# Patient Record
Sex: FEMALE | Race: WHITE | Employment: OTHER | URBAN - METROPOLITAN AREA
[De-identification: names, ages, dates, MRNs, and addresses within clinical notes are randomized per-mention and may not be internally consistent; named-entity substitution may affect disease eponyms.]

---

## 2020-02-20 ENCOUNTER — TELEPHONE (OUTPATIENT)
Dept: INTERNAL MEDICINE CLINIC | Facility: CLINIC | Age: 70
End: 2020-02-20

## 2020-02-20 ENCOUNTER — OFFICE VISIT (OUTPATIENT)
Dept: INTERNAL MEDICINE CLINIC | Facility: CLINIC | Age: 70
End: 2020-02-20
Payer: MEDICARE

## 2020-02-20 VITALS
BODY MASS INDEX: 29.26 KG/M2 | HEART RATE: 76 BPM | RESPIRATION RATE: 16 BRPM | HEIGHT: 63.5 IN | SYSTOLIC BLOOD PRESSURE: 132 MMHG | OXYGEN SATURATION: 98 % | DIASTOLIC BLOOD PRESSURE: 74 MMHG | WEIGHT: 167.19 LBS

## 2020-02-20 DIAGNOSIS — E03.8 OTHER SPECIFIED HYPOTHYROIDISM: ICD-10-CM

## 2020-02-20 DIAGNOSIS — E78.9 BORDERLINE HIGH CHOLESTEROL: ICD-10-CM

## 2020-02-20 DIAGNOSIS — Z85.828 HISTORY OF BASAL CELL CARCINOMA OF SKIN: ICD-10-CM

## 2020-02-20 DIAGNOSIS — E89.0 S/P TOTAL THYROIDECTOMY: ICD-10-CM

## 2020-02-20 DIAGNOSIS — Z12.11 SCREEN FOR COLON CANCER: ICD-10-CM

## 2020-02-20 DIAGNOSIS — E03.8 OTHER SPECIFIED HYPOTHYROIDISM: Primary | ICD-10-CM

## 2020-02-20 DIAGNOSIS — E78.9 BORDERLINE HIGH CHOLESTEROL: Primary | ICD-10-CM

## 2020-02-20 PROBLEM — Z98.890 S/P TOTAL THYROIDECTOMY: Status: ACTIVE | Noted: 2020-02-20

## 2020-02-20 PROBLEM — Z90.89 S/P TOTAL THYROIDECTOMY: Status: ACTIVE | Noted: 2020-02-20

## 2020-02-20 PROCEDURE — 99203 OFFICE O/P NEW LOW 30 MIN: CPT | Performed by: INTERNAL MEDICINE

## 2020-02-20 RX ORDER — LEVOTHYROXINE SODIUM 0.1 MG/1
100 TABLET ORAL
COMMUNITY
Start: 2020-01-23 | End: 2020-08-28

## 2020-02-20 NOTE — PROGRESS NOTES
He Mccoy is a 78year old female.   Patient presents with:  New Patient: cn room 4: new patient here to establish care , no issues       HPI:     Patient here for establishing care-  hypothroidism - takes name brand synthroid and feels better when ts supple,no adenopathy, s/p thyroidectomy  LUNGS: normal rate without respiratory distress, lungs clear to auscultation  CARDIO: RRR nl S1 S2   NEURO: Alert and oriented    ASSESSMENT AND PLAN:   Other specified hypothyroidism  - clinically stable, rpt labs

## 2020-02-20 NOTE — TELEPHONE ENCOUNTER
Wellness   Future Appointments   Date Time Provider Gregg Wilson   8/10/2020  9:00 AM Jessica Khan MD [facility]     Orders to Vibha Platts  aware must fast no call back required

## 2020-07-27 ENCOUNTER — LAB ENCOUNTER (OUTPATIENT)
Dept: LAB | Age: 70
End: 2020-07-27
Attending: INTERNAL MEDICINE
Payer: MEDICARE

## 2020-07-27 DIAGNOSIS — Z85.828 HISTORY OF BASAL CELL CARCINOMA OF SKIN: ICD-10-CM

## 2020-07-27 DIAGNOSIS — E78.9 BORDERLINE HIGH CHOLESTEROL: ICD-10-CM

## 2020-07-27 DIAGNOSIS — E03.8 OTHER SPECIFIED HYPOTHYROIDISM: ICD-10-CM

## 2020-07-27 LAB
ALBUMIN SERPL-MCNC: 3.5 G/DL (ref 3.4–5)
ALBUMIN/GLOB SERPL: 1.2 {RATIO} (ref 1–2)
ALP LIVER SERPL-CCNC: 56 U/L (ref 55–142)
ALT SERPL-CCNC: 19 U/L (ref 13–56)
ANION GAP SERPL CALC-SCNC: 1 MMOL/L (ref 0–18)
AST SERPL-CCNC: 13 U/L (ref 15–37)
BASOPHILS # BLD AUTO: 0.03 X10(3) UL (ref 0–0.2)
BASOPHILS NFR BLD AUTO: 0.6 %
BILIRUB SERPL-MCNC: 0.6 MG/DL (ref 0.1–2)
BUN BLD-MCNC: 15 MG/DL (ref 7–18)
BUN/CREAT SERPL: 15.2 (ref 10–20)
CALCIUM BLD-MCNC: 8.5 MG/DL (ref 8.5–10.1)
CHLORIDE SERPL-SCNC: 109 MMOL/L (ref 98–112)
CHOLEST SMN-MCNC: 185 MG/DL (ref ?–200)
CO2 SERPL-SCNC: 31 MMOL/L (ref 21–32)
CREAT BLD-MCNC: 0.99 MG/DL (ref 0.55–1.02)
DEPRECATED RDW RBC AUTO: 42.3 FL (ref 35.1–46.3)
EOSINOPHIL # BLD AUTO: 0.14 X10(3) UL (ref 0–0.7)
EOSINOPHIL NFR BLD AUTO: 2.9 %
ERYTHROCYTE [DISTWIDTH] IN BLOOD BY AUTOMATED COUNT: 12.2 % (ref 11–15)
GLOBULIN PLAS-MCNC: 3 G/DL (ref 2.8–4.4)
GLUCOSE BLD-MCNC: 78 MG/DL (ref 70–99)
HCT VFR BLD AUTO: 44.7 % (ref 35–48)
HDLC SERPL-MCNC: 58 MG/DL (ref 40–59)
HGB BLD-MCNC: 14.3 G/DL (ref 12–16)
IMM GRANULOCYTES # BLD AUTO: 0.01 X10(3) UL (ref 0–1)
IMM GRANULOCYTES NFR BLD: 0.2 %
LDLC SERPL CALC-MCNC: 109 MG/DL (ref ?–100)
LYMPHOCYTES # BLD AUTO: 0.93 X10(3) UL (ref 1–4)
LYMPHOCYTES NFR BLD AUTO: 19.6 %
M PROTEIN MFR SERPL ELPH: 6.5 G/DL (ref 6.4–8.2)
MCH RBC QN AUTO: 30.5 PG (ref 26–34)
MCHC RBC AUTO-ENTMCNC: 32 G/DL (ref 31–37)
MCV RBC AUTO: 95.3 FL (ref 80–100)
MONOCYTES # BLD AUTO: 0.46 X10(3) UL (ref 0.1–1)
MONOCYTES NFR BLD AUTO: 9.7 %
NEUTROPHILS # BLD AUTO: 3.18 X10 (3) UL (ref 1.5–7.7)
NEUTROPHILS # BLD AUTO: 3.18 X10(3) UL (ref 1.5–7.7)
NEUTROPHILS NFR BLD AUTO: 67 %
NONHDLC SERPL-MCNC: 127 MG/DL (ref ?–130)
OSMOLALITY SERPL CALC.SUM OF ELEC: 292 MOSM/KG (ref 275–295)
PATIENT FASTING Y/N/NP: YES
PATIENT FASTING Y/N/NP: YES
PLATELET # BLD AUTO: 166 10(3)UL (ref 150–450)
POTASSIUM SERPL-SCNC: 4 MMOL/L (ref 3.5–5.1)
RBC # BLD AUTO: 4.69 X10(6)UL (ref 3.8–5.3)
SODIUM SERPL-SCNC: 141 MMOL/L (ref 136–145)
T4 FREE SERPL-MCNC: 1.2 NG/DL (ref 0.8–1.7)
TRIGL SERPL-MCNC: 91 MG/DL (ref 30–149)
TSI SER-ACNC: 1.14 MIU/ML (ref 0.36–3.74)
VLDLC SERPL CALC-MCNC: 18 MG/DL (ref 0–30)
WBC # BLD AUTO: 4.8 X10(3) UL (ref 4–11)

## 2020-07-27 PROCEDURE — 84443 ASSAY THYROID STIM HORMONE: CPT

## 2020-07-27 PROCEDURE — 85025 COMPLETE CBC W/AUTO DIFF WBC: CPT

## 2020-07-27 PROCEDURE — 84439 ASSAY OF FREE THYROXINE: CPT

## 2020-07-27 PROCEDURE — 80061 LIPID PANEL: CPT

## 2020-07-27 PROCEDURE — 80053 COMPREHEN METABOLIC PANEL: CPT

## 2020-08-10 ENCOUNTER — OFFICE VISIT (OUTPATIENT)
Dept: INTERNAL MEDICINE CLINIC | Facility: CLINIC | Age: 70
End: 2020-08-10
Payer: MEDICARE

## 2020-08-10 VITALS
HEIGHT: 63.5 IN | BODY MASS INDEX: 29.22 KG/M2 | WEIGHT: 167 LBS | TEMPERATURE: 98 F | SYSTOLIC BLOOD PRESSURE: 124 MMHG | HEART RATE: 72 BPM | DIASTOLIC BLOOD PRESSURE: 68 MMHG

## 2020-08-10 DIAGNOSIS — M21.612 BUNION, LEFT FOOT: ICD-10-CM

## 2020-08-10 DIAGNOSIS — Z12.11 SCREEN FOR COLON CANCER: ICD-10-CM

## 2020-08-10 DIAGNOSIS — B35.1 ONYCHOMYCOSIS: ICD-10-CM

## 2020-08-10 DIAGNOSIS — Z78.0 POST-MENOPAUSAL: ICD-10-CM

## 2020-08-10 DIAGNOSIS — Z00.00 ENCOUNTER FOR ANNUAL HEALTH EXAMINATION: Primary | ICD-10-CM

## 2020-08-10 DIAGNOSIS — E03.8 OTHER SPECIFIED HYPOTHYROIDISM: ICD-10-CM

## 2020-08-10 PROCEDURE — G0439 PPPS, SUBSEQ VISIT: HCPCS | Performed by: INTERNAL MEDICINE

## 2020-08-10 RX ORDER — LEVOTHYROXINE SODIUM 0.1 MG/1
100 TABLET ORAL
Qty: 90 TABLET | Refills: 3 | Status: SHIPPED | OUTPATIENT
Start: 2020-08-10 | End: 2020-08-14

## 2020-08-10 NOTE — PROGRESS NOTES
HPI:   Krystal Mathias is a 78year old female who presents for a Medicare Subsequent Annual Wellness visit (Pt already had Initial Annual Wellness).     Patient here for wellness  Hypothroidism - takes name brand synthroid, feels well on current dose, s/ (75.8 kg)  02/20/20 : 167 lb 3.2 oz (75.8 kg)  04/03/15 : 212 lb (96.2 kg)     Last Cholesterol Labs:   Lab Results   Component Value Date    CHOLEST 185 07/27/2020    HDL 58 07/27/2020     (H) 07/27/2020    TRIG 91 07/27/2020          Last Chemistr stated age   Head:  Normocephalic, without obvious abnormality, atraumatic   Eyes:  PERRL, conjunctiva/corneas clear, EOM's intact both eyes   Ears:  Normal TM's and external ear canals, both ears   Nose: deferred   Throat: deferred   Neck: Supple, symmetr lifestyle, and exercise. Return if symptoms worsen or fail to improve.      Clelia Soulier, MD, 8/10/2020     General Health     In the past six months, have you lost more than 10 pounds without trying?: 2 - No  Has your appetite been poor?: No  How does Chlamydia  Annually if high risk No results found for: CHLAMYDIA No flowsheet data found.     Screening Mammogram      Mammogram Annually to 75, then as discussed Mammogram due on 08/26/1989 Update Health Maintenance if applicable     Immunizations (Update

## 2020-08-10 NOTE — PATIENT INSTRUCTIONS
Jose Isabel's SCREENING SCHEDULE   Tests on this list are recommended by your physician but may not be covered, or covered at this frequency, by your insurer. Please check with your insurance carrier before scheduling to verify coverage.    PREVENTATI abnormal Colonoscopy due on 08/26/1999 Update [facility] if applicable    Flex Sigmoidoscopy Screen  Covered every 5 years No results found for this or any previous visit. No flowsheet data found.      Fecal Occult Blood   Covered Annually No result or any previous visit. Please get once after your 65th birthday    Hepatitis B for Moderate/High Risk       No orders found for this or any previous visit.  Medium/high risk factors:   End-stage renal disease   Hemophiliacs who received Factor VIII or IX co

## 2020-08-11 ENCOUNTER — LAB ENCOUNTER (OUTPATIENT)
Dept: LAB | Age: 70
End: 2020-08-11
Attending: INTERNAL MEDICINE
Payer: MEDICARE

## 2020-08-11 DIAGNOSIS — Z12.11 SCREEN FOR COLON CANCER: ICD-10-CM

## 2020-08-11 PROCEDURE — 82274 ASSAY TEST FOR BLOOD FECAL: CPT

## 2020-08-11 PROCEDURE — 82272 OCCULT BLD FECES 1-3 TESTS: CPT

## 2020-08-13 ENCOUNTER — TELEPHONE (OUTPATIENT)
Dept: INTERNAL MEDICINE CLINIC | Facility: CLINIC | Age: 70
End: 2020-08-13

## 2020-08-13 LAB — HEMOCCULT STL QL: POSITIVE

## 2020-08-13 NOTE — TELEPHONE ENCOUNTER
Pt calling back to ask that our office FU on the PA for [facility]     SYNTHROID 175       Pt does not take the generic, has taken the Synthroid, since the beginning. Pt is out. May need to have a short term supply go to local pharmacy.

## 2020-08-13 NOTE — TELEPHONE ENCOUNTER
Pt is calling because she received a call from her pharmacy(automated recording) stating they need more information about this medication, Pt can only take name brand of the synthroid. I asked her if they need a PA she is not sure.  She is going to call the

## 2020-08-14 ENCOUNTER — TELEPHONE (OUTPATIENT)
Dept: INTERNAL MEDICINE CLINIC | Facility: CLINIC | Age: 70
End: 2020-08-14

## 2020-08-14 ENCOUNTER — PATIENT MESSAGE (OUTPATIENT)
Dept: INTERNAL MEDICINE CLINIC | Facility: CLINIC | Age: 70
End: 2020-08-14

## 2020-08-14 DIAGNOSIS — Z12.11 ENCOUNTER FOR SCREENING COLONOSCOPY: ICD-10-CM

## 2020-08-14 DIAGNOSIS — R19.5 FECAL OCCULT BLOOD TEST POSITIVE: Primary | ICD-10-CM

## 2020-08-14 RX ORDER — LEVOTHYROXINE SODIUM 0.1 MG/1
100 TABLET ORAL
Qty: 90 TABLET | Refills: 3 | Status: CANCELLED | OUTPATIENT
Start: 2020-08-14

## 2020-08-14 RX ORDER — LEVOTHYROXINE SODIUM 0.1 MG/1
100 TABLET ORAL
Qty: 30 TABLET | Refills: 0 | Status: SHIPPED | OUTPATIENT
Start: 2020-08-14 | End: 2021-08-27

## 2020-08-14 NOTE — TELEPHONE ENCOUNTER
Spoke with [facility], stated that Levothyroxine is filled as Synthroid (name brand) regardless if EDWIN is written on Rx or not. States that if this changes, the prescriber will be notified.      At this time, the patient will get the medication will

## 2020-08-14 NOTE — TELEPHONE ENCOUNTER
From: Jay Marquez  To: Costa Astorga MD  Sent: 8/14/2020 10:07 AM CDT  Subject: Prescription Question    [facility] contacted your office and had a question regarding the renewal of my Synthroid 100 mcg medication.  As we discussed with my visit o

## 2020-08-17 ENCOUNTER — HOSPITAL ENCOUNTER (OUTPATIENT)
Dept: BONE DENSITY | Age: 70
Discharge: HOME | End: 2020-08-17
Attending: INTERNAL MEDICINE
Payer: MEDICARE

## 2020-08-17 DIAGNOSIS — Z78.0 POST-MENOPAUSAL: ICD-10-CM

## 2020-08-17 PROCEDURE — 77080 DXA BONE DENSITY AXIAL: CPT | Performed by: INTERNAL MEDICINE

## 2020-08-22 ENCOUNTER — MED REC SCAN ONLY (OUTPATIENT)
Dept: INTERNAL MEDICINE CLINIC | Facility: CLINIC | Age: 70
End: 2020-08-22

## 2020-08-28 ENCOUNTER — OFFICE VISIT (OUTPATIENT)
Dept: PODIATRY CLINIC | Facility: CLINIC | Age: 70
End: 2020-08-28
Payer: MEDICARE

## 2020-08-28 DIAGNOSIS — M20.12 HALLUX VALGUS OF LEFT FOOT: ICD-10-CM

## 2020-08-28 DIAGNOSIS — B35.1 ONYCHOMYCOSIS: Primary | ICD-10-CM

## 2020-08-28 PROCEDURE — 99203 OFFICE O/P NEW LOW 30 MIN: CPT | Performed by: PODIATRIST

## 2020-08-28 RX ORDER — MULTIVIT-MIN/IRON FUM/FOLIC AC 7.5 MG-4
1 TABLET ORAL DAILY
COMMUNITY

## 2020-08-30 NOTE — PROGRESS NOTES
Melany Moser is a 69year old female. Patient presents with:  Consult: left foot bunion and  toenail fungus on left 2,3 toes-- States great toe is affecting other toes. No interested in surgery. States she has had toenail fungus for years.  Rates pain 1/ Topics      Concerns:          REVIEW OF SYSTEMS:   Today reviewed systens as documented below  GENERAL HEALTH: feels well otherwise  SKIN: Refer to exam below  RESPIRATORY: denies shortness of breath with exertion  CARDIOVASCULAR: denies chest pain on exe DEJA

## 2020-08-31 ENCOUNTER — APPOINTMENT (OUTPATIENT)
Dept: LAB | Facility: HOSPITAL | Age: 70
End: 2020-08-31
Attending: INTERNAL MEDICINE
Payer: MEDICARE

## 2020-08-31 DIAGNOSIS — Z01.818 PRE-OP TESTING: ICD-10-CM

## 2020-09-01 LAB — SARS-COV-2 RNA RESP QL NAA+PROBE: NOT DETECTED

## 2020-09-02 PROBLEM — D12.4 BENIGN NEOPLASM OF DESCENDING COLON: Status: ACTIVE | Noted: 2020-09-02

## 2020-09-02 PROBLEM — Z12.11 SPECIAL SCREENING FOR MALIGNANT NEOPLASM OF COLON: Status: ACTIVE | Noted: 2020-09-02

## 2020-09-02 PROBLEM — Z83.71 FAMILY HISTORY OF COLONIC POLYPS: Status: ACTIVE | Noted: 2020-09-02

## 2020-09-02 PROBLEM — Z83.719 FAMILY HISTORY OF COLONIC POLYPS: Status: ACTIVE | Noted: 2020-09-02

## 2020-09-02 PROBLEM — D12.3 BENIGN NEOPLASM OF TRANSVERSE COLON: Status: ACTIVE | Noted: 2020-09-02

## 2020-09-02 PROBLEM — D12.5 BENIGN NEOPLASM OF SIGMOID COLON: Status: ACTIVE | Noted: 2020-09-02

## 2020-09-15 ENCOUNTER — PATIENT MESSAGE (OUTPATIENT)
Dept: PODIATRY CLINIC | Facility: CLINIC | Age: 70
End: 2020-09-15

## 2020-09-15 ENCOUNTER — TELEPHONE (OUTPATIENT)
Dept: PODIATRY CLINIC | Facility: CLINIC | Age: 70
End: 2020-09-15

## 2020-09-15 NOTE — TELEPHONE ENCOUNTER
Please see result note from 8/28/20. Spoke to patient. Advised patient I would forward her message to .

## 2020-09-28 ENCOUNTER — PATIENT MESSAGE (OUTPATIENT)
Dept: OTHER | Age: 70
End: 2020-09-28

## 2020-12-30 ENCOUNTER — IMMUNIZATION (OUTPATIENT)
Dept: INTERNAL MEDICINE CLINIC | Facility: CLINIC | Age: 70
End: 2020-12-30
Payer: MEDICARE

## 2020-12-30 DIAGNOSIS — Z23 NEED FOR VACCINATION: ICD-10-CM

## 2020-12-30 PROCEDURE — G0008 ADMIN INFLUENZA VIRUS VAC: HCPCS | Performed by: INTERNAL MEDICINE

## 2020-12-30 PROCEDURE — 90686 IIV4 VACC NO PRSV 0.5 ML IM: CPT | Performed by: INTERNAL MEDICINE

## 2021-01-27 ENCOUNTER — HOSPITAL ENCOUNTER (OUTPATIENT)
Dept: MAMMOGRAPHY | Age: 71
Discharge: HOME | End: 2021-01-27
Attending: INTERNAL MEDICINE
Payer: MEDICARE

## 2021-01-27 DIAGNOSIS — Z12.31 ENCOUNTER FOR SCREENING MAMMOGRAM FOR MALIGNANT NEOPLASM OF BREAST: ICD-10-CM

## 2021-01-27 DIAGNOSIS — R92.1 BREAST CALCIFICATIONS: ICD-10-CM

## 2021-01-27 PROCEDURE — 77063 BREAST TOMOSYNTHESIS BI: CPT | Performed by: INTERNAL MEDICINE

## 2021-01-27 PROCEDURE — 77067 SCR MAMMO BI INCL CAD: CPT | Performed by: INTERNAL MEDICINE

## 2021-01-31 ENCOUNTER — PATIENT MESSAGE (OUTPATIENT)
Dept: INTERNAL MEDICINE CLINIC | Facility: CLINIC | Age: 71
End: 2021-01-31

## 2021-02-01 NOTE — TELEPHONE ENCOUNTER
From: Meghan Sepulveda  To: Janelle Mack MD  Sent: 1/31/2021 6:47 PM CST  Subject: Other    Dr. Ashvin Smith,    Follow up to 1st e-mail previously sent.  I failed to mention that I have previously seen an optometrist for an eye exam outside of [facility]

## 2021-04-05 ENCOUNTER — LABORATORY ENCOUNTER (OUTPATIENT)
Dept: LAB | Age: 71
End: 2021-04-05
Attending: INTERNAL MEDICINE
Payer: MEDICARE

## 2021-04-05 DIAGNOSIS — E03.8 OTHER SPECIFIED HYPOTHYROIDISM: ICD-10-CM

## 2021-04-05 PROCEDURE — 84443 ASSAY THYROID STIM HORMONE: CPT

## 2021-04-05 PROCEDURE — 84439 ASSAY OF FREE THYROXINE: CPT

## 2021-04-05 PROCEDURE — 36415 COLL VENOUS BLD VENIPUNCTURE: CPT

## 2021-04-09 ENCOUNTER — OFFICE VISIT (OUTPATIENT)
Dept: INTERNAL MEDICINE CLINIC | Facility: CLINIC | Age: 71
End: 2021-04-09
Payer: MEDICARE

## 2021-04-09 VITALS
SYSTOLIC BLOOD PRESSURE: 138 MMHG | RESPIRATION RATE: 16 BRPM | HEART RATE: 72 BPM | HEIGHT: 63.39 IN | WEIGHT: 168 LBS | DIASTOLIC BLOOD PRESSURE: 78 MMHG | BODY MASS INDEX: 29.4 KG/M2 | TEMPERATURE: 98 F

## 2021-04-09 DIAGNOSIS — R79.89 ABNORMAL TSH: Primary | ICD-10-CM

## 2021-04-09 DIAGNOSIS — R00.2 PALPITATIONS: ICD-10-CM

## 2021-04-09 DIAGNOSIS — E78.9 BORDERLINE HIGH CHOLESTEROL: ICD-10-CM

## 2021-04-09 PROCEDURE — 99213 OFFICE O/P EST LOW 20 MIN: CPT | Performed by: INTERNAL MEDICINE

## 2021-04-09 NOTE — PROGRESS NOTES
Germania Bro is a 69year old female.   Patient presents with:  Lab Results: rm 3 DO: pt here to follow-up on labs 4/5/21 for TSH      HPI:     Patient here for f/u-  Hypothyroidism- she takes synthroid 100mcg daily except on 2 sundays per month she ta (Other) Mother         pneumonia        Allergies    Penicillins             ITCHING    Comment:As child      REVIEW OF SYSTEMS:   GENERAL HEALTH:  no fevers  RESPIRATORY: no cough  CARDIOVASCULAR: denies chest pain  GI: denies abdominal pain  : no dysur

## 2021-06-14 ENCOUNTER — TELEPHONE (OUTPATIENT)
Dept: INTERNAL MEDICINE CLINIC | Facility: CLINIC | Age: 71
End: 2021-06-14

## 2021-06-14 NOTE — TELEPHONE ENCOUNTER
Future Appointments   Date Time Provider Gregg Katie   8/13/2021 11:20 AM Jessica Khan MD [facility]     Annual Physical   Lab is THE [facility]  Pt aware to fast and to complete labs no sooner than 2 weeks prior to physical   No call back requi

## 2021-07-26 ENCOUNTER — LABORATORY ENCOUNTER (OUTPATIENT)
Dept: LAB | Age: 71
End: 2021-07-26
Attending: INTERNAL MEDICINE
Payer: MEDICARE

## 2021-07-26 DIAGNOSIS — E78.9 BORDERLINE HIGH CHOLESTEROL: ICD-10-CM

## 2021-07-26 DIAGNOSIS — R79.89 ABNORMAL TSH: ICD-10-CM

## 2021-07-26 DIAGNOSIS — R00.2 PALPITATIONS: ICD-10-CM

## 2021-07-26 LAB
ALBUMIN SERPL-MCNC: 3.7 G/DL (ref 3.4–5)
ALBUMIN/GLOB SERPL: 1.2 {RATIO} (ref 1–2)
ALP LIVER SERPL-CCNC: 61 U/L
ALT SERPL-CCNC: 22 U/L
ANION GAP SERPL CALC-SCNC: 4 MMOL/L (ref 0–18)
AST SERPL-CCNC: 16 U/L (ref 15–37)
BASOPHILS # BLD AUTO: 0.04 X10(3) UL (ref 0–0.2)
BASOPHILS NFR BLD AUTO: 0.8 %
BILIRUB SERPL-MCNC: 0.5 MG/DL (ref 0.1–2)
BUN BLD-MCNC: 11 MG/DL (ref 7–18)
BUN/CREAT SERPL: 11.5 (ref 10–20)
CALCIUM BLD-MCNC: 8.8 MG/DL (ref 8.5–10.1)
CHLORIDE SERPL-SCNC: 104 MMOL/L (ref 98–112)
CHOLEST SMN-MCNC: 214 MG/DL (ref ?–200)
CO2 SERPL-SCNC: 30 MMOL/L (ref 21–32)
CREAT BLD-MCNC: 0.96 MG/DL
DEPRECATED RDW RBC AUTO: 41.2 FL (ref 35.1–46.3)
EOSINOPHIL # BLD AUTO: 0.12 X10(3) UL (ref 0–0.7)
EOSINOPHIL NFR BLD AUTO: 2.4 %
ERYTHROCYTE [DISTWIDTH] IN BLOOD BY AUTOMATED COUNT: 12.1 % (ref 11–15)
GLOBULIN PLAS-MCNC: 3.1 G/DL (ref 2.8–4.4)
GLUCOSE BLD-MCNC: 85 MG/DL (ref 70–99)
HCT VFR BLD AUTO: 46.3 %
HDLC SERPL-MCNC: 59 MG/DL (ref 40–59)
HGB BLD-MCNC: 15.3 G/DL
IMM GRANULOCYTES # BLD AUTO: 0.01 X10(3) UL (ref 0–1)
IMM GRANULOCYTES NFR BLD: 0.2 %
LDLC SERPL CALC-MCNC: 136 MG/DL (ref ?–100)
LYMPHOCYTES # BLD AUTO: 0.92 X10(3) UL (ref 1–4)
LYMPHOCYTES NFR BLD AUTO: 18.2 %
M PROTEIN MFR SERPL ELPH: 6.8 G/DL (ref 6.4–8.2)
MCH RBC QN AUTO: 30.5 PG (ref 26–34)
MCHC RBC AUTO-ENTMCNC: 33 G/DL (ref 31–37)
MCV RBC AUTO: 92.2 FL
MONOCYTES # BLD AUTO: 0.49 X10(3) UL (ref 0.1–1)
MONOCYTES NFR BLD AUTO: 9.7 %
NEUTROPHILS # BLD AUTO: 3.47 X10 (3) UL (ref 1.5–7.7)
NEUTROPHILS # BLD AUTO: 3.47 X10(3) UL (ref 1.5–7.7)
NEUTROPHILS NFR BLD AUTO: 68.7 %
NONHDLC SERPL-MCNC: 155 MG/DL (ref ?–130)
OSMOLALITY SERPL CALC.SUM OF ELEC: 285 MOSM/KG (ref 275–295)
PATIENT FASTING Y/N/NP: YES
PATIENT FASTING Y/N/NP: YES
PLATELET # BLD AUTO: 170 10(3)UL (ref 150–450)
POTASSIUM SERPL-SCNC: 4.2 MMOL/L (ref 3.5–5.1)
RBC # BLD AUTO: 5.02 X10(6)UL
SODIUM SERPL-SCNC: 138 MMOL/L (ref 136–145)
T4 FREE SERPL-MCNC: 1.3 NG/DL (ref 0.8–1.7)
TRIGL SERPL-MCNC: 107 MG/DL (ref 30–149)
TSI SER-ACNC: 0.48 MIU/ML (ref 0.36–3.74)
VLDLC SERPL CALC-MCNC: 20 MG/DL (ref 0–30)
WBC # BLD AUTO: 5.1 X10(3) UL (ref 4–11)

## 2021-07-26 PROCEDURE — 80061 LIPID PANEL: CPT

## 2021-07-26 PROCEDURE — 85025 COMPLETE CBC W/AUTO DIFF WBC: CPT

## 2021-07-26 PROCEDURE — 84443 ASSAY THYROID STIM HORMONE: CPT

## 2021-07-26 PROCEDURE — 84439 ASSAY OF FREE THYROXINE: CPT

## 2021-07-26 PROCEDURE — 36415 COLL VENOUS BLD VENIPUNCTURE: CPT

## 2021-07-26 PROCEDURE — 80053 COMPREHEN METABOLIC PANEL: CPT

## 2021-08-07 ENCOUNTER — OFFICE VISIT (OUTPATIENT)
Dept: FAMILY MEDICINE CLINIC | Facility: CLINIC | Age: 71
End: 2021-08-07
Payer: MEDICARE

## 2021-08-07 ENCOUNTER — PATIENT MESSAGE (OUTPATIENT)
Dept: INTERNAL MEDICINE CLINIC | Facility: CLINIC | Age: 71
End: 2021-08-07

## 2021-08-07 VITALS
RESPIRATION RATE: 20 BRPM | BODY MASS INDEX: 29.77 KG/M2 | TEMPERATURE: 96 F | DIASTOLIC BLOOD PRESSURE: 82 MMHG | SYSTOLIC BLOOD PRESSURE: 132 MMHG | HEIGHT: 63 IN | HEART RATE: 75 BPM | OXYGEN SATURATION: 97 % | WEIGHT: 168 LBS

## 2021-08-07 DIAGNOSIS — B02.9 HERPES ZOSTER WITHOUT COMPLICATION: Primary | ICD-10-CM

## 2021-08-07 PROCEDURE — 99213 OFFICE O/P EST LOW 20 MIN: CPT | Performed by: NURSE PRACTITIONER

## 2021-08-07 RX ORDER — VALACYCLOVIR HYDROCHLORIDE 1 G/1
1 TABLET, FILM COATED ORAL 3 TIMES DAILY
Qty: 21 TABLET | Refills: 0 | Status: SHIPPED | OUTPATIENT
Start: 2021-08-07 | End: 2021-08-14

## 2021-08-07 NOTE — PROGRESS NOTES
CHIEF COMPLAINT:   Patient presents with:  Rash: both arm, blister x2days         HPI:    Ольга Signs is a 69year old female who presents for evaluation of a rash. Per patient rash started in the past 1 days. Rash has been worsening since onset.   P throat. CARDIOVASCULAR: Denies chest pains or palpitations. LUNGS: Denies shortness of breath with exertion or rest. No cough or wheezing. LYMPH: Denies enlargement of the lymph nodes.   NEURO: Denies abnormal sensation, tingling of the skin, or numbness

## 2021-08-09 NOTE — TELEPHONE ENCOUNTER
Per previous encounter    Dr. Iona Aguilar     On August 5 when I woke up there were medium size blotches of redness on my right forearm along with some smaller blotches.  No itching involved but I noticed what appeared to be afew very small blisters on the sec

## 2021-08-09 NOTE — TELEPHONE ENCOUNTER
From: Dionne Rojas  To: Lord Mita MD  Sent: 8/7/2021 10:54 AM CDT  Subject: Other    Dr. Phu Isabel, I sent a previous e-mail regarding a rash appearing on my right forearm suspecting a possible case of the shingles.  I decided to go to a [employer]

## 2021-08-09 NOTE — TELEPHONE ENCOUNTER
Please see both patient messages below. On valacyclovir. Looks like her appt Friday was an annual and she cancelled, reschedule for 8/27. Do you suggest she monitor and cont med or schedule OV this week with avail provider?

## 2021-08-27 ENCOUNTER — OFFICE VISIT (OUTPATIENT)
Dept: INTERNAL MEDICINE CLINIC | Facility: CLINIC | Age: 71
End: 2021-08-27
Payer: MEDICARE

## 2021-08-27 VITALS
TEMPERATURE: 97 F | BODY MASS INDEX: 29.4 KG/M2 | HEART RATE: 66 BPM | WEIGHT: 168 LBS | HEIGHT: 63.19 IN | RESPIRATION RATE: 16 BRPM | SYSTOLIC BLOOD PRESSURE: 112 MMHG | OXYGEN SATURATION: 96 % | DIASTOLIC BLOOD PRESSURE: 74 MMHG

## 2021-08-27 DIAGNOSIS — B02.9 HERPES ZOSTER WITHOUT COMPLICATION: ICD-10-CM

## 2021-08-27 DIAGNOSIS — E03.8 OTHER SPECIFIED HYPOTHYROIDISM: ICD-10-CM

## 2021-08-27 DIAGNOSIS — Z00.00 ENCOUNTER FOR ANNUAL HEALTH EXAMINATION: Primary | ICD-10-CM

## 2021-08-27 PROCEDURE — G0439 PPPS, SUBSEQ VISIT: HCPCS | Performed by: INTERNAL MEDICINE

## 2021-08-27 RX ORDER — LEVOTHYROXINE SODIUM 0.1 MG/1
100 TABLET ORAL
Qty: 90 TABLET | Refills: 3 | Status: SHIPPED | OUTPATIENT
Start: 2021-08-27

## 2021-08-27 NOTE — PATIENT INSTRUCTIONS
Fauzia Isabel's SCREENING SCHEDULE   Tests on this list are recommended by your physician but may not be covered, or covered at this frequency, by your insurer. Please check with your insurance carrier before scheduling to verify coverage.    PREVEN 08/17/2020      No recommendations at this time   Pap and Pelvic    Pap   Covered every 2 years for women at normal risk;  Annually if at high risk -  No recommendations at this time    Chlamydia Annually if high risk -  No recommendations at this time   Sc regarding Advance Directives.

## 2021-08-27 NOTE — PROGRESS NOTES
HPI:   Ольга King is a 66year old female who presents for a Medicare Subsequent Annual Wellness visit (Pt already had Initial Annual Wellness). Patient with hypothyroidism and recent shingles outbreak on her right forearm here for wellness.  Do descending colon     Benign neoplasm of sigmoid colon    Wt Readings from Last 3 Encounters:  08/27/21 : 168 lb (76.2 kg)  08/07/21 : 168 lb (76.2 kg)  04/09/21 : 168 lb (76.2 kg)     Last Cholesterol Labs:   Lab Results   Component Value Date    CHOLEST 2 following:    Height as of this encounter: 5' 3.19\" (1.605 m). Weight as of this encounter: 168 lb (76.2 kg).     Medicare Hearing Assessment  (Required for AWV/SWV)    {Hearing finger rub wnl       Visual Acuity grossly wnl                           Ge complication- rash is resolving, pt has no pain. She will consider shingrix vaccine         Diet assessment: good     PLAN:  The patient indicates understanding of these issues and agrees to the plan. Continue with current treatment plan.   Reinforced heal Covered once in a lifetime for one of the following risk factors   • Men who are 73-68 years old and have ever smoked   • Anyone with a family history -     Colorectal Cancer Screening  Covered for ages 52-80; only need ONE of the following:    Colonoscopy Medicare Part B; may be covered with your pharmacy  prescription benefits -  Zoster Vaccines(1 of 2) Never done

## 2022-01-17 ENCOUNTER — PATIENT MESSAGE (OUTPATIENT)
Dept: INTERNAL MEDICINE CLINIC | Facility: CLINIC | Age: 72
End: 2022-01-17

## 2022-01-17 DIAGNOSIS — Z12.31 ENCOUNTER FOR SCREENING MAMMOGRAM FOR MALIGNANT NEOPLASM OF BREAST: Primary | ICD-10-CM

## 2022-01-17 NOTE — TELEPHONE ENCOUNTER
From: Amy Mckeon  To: Svetlana Little MD  Sent: 1/17/2022 5:12 PM CST  Subject: Annual Mammogram Screening    I'm due for my annual Mammogram. Previous screening done 1/27/21.  I received notification from the [place] [employer]( located on [address] [place] [address]

## 2022-02-04 ENCOUNTER — LAB ENCOUNTER (OUTPATIENT)
Dept: LAB | Age: 72
End: 2022-02-04
Attending: INTERNAL MEDICINE
Payer: MEDICARE

## 2022-02-04 DIAGNOSIS — E03.8 OTHER SPECIFIED HYPOTHYROIDISM: ICD-10-CM

## 2022-02-04 LAB — T4 FREE SERPL-MCNC: 1.5 NG/DL (ref 0.8–1.7)

## 2022-02-04 PROCEDURE — 84443 ASSAY THYROID STIM HORMONE: CPT

## 2022-02-04 PROCEDURE — 84439 ASSAY OF FREE THYROXINE: CPT

## 2022-02-04 PROCEDURE — 36415 COLL VENOUS BLD VENIPUNCTURE: CPT

## 2022-02-07 ENCOUNTER — TELEPHONE (OUTPATIENT)
Dept: INTERNAL MEDICINE CLINIC | Facility: CLINIC | Age: 72
End: 2022-02-07

## 2022-02-07 NOTE — TELEPHONE ENCOUNTER
Called pt to schedule follow up on labs per lab results. Pt stated she knows about her thyroid labs and wants to discuss with nurse.

## 2022-02-09 ENCOUNTER — TELEPHONE (OUTPATIENT)
Dept: INTERNAL MEDICINE CLINIC | Facility: CLINIC | Age: 72
End: 2022-02-09

## 2022-02-10 ENCOUNTER — HOSPITAL ENCOUNTER (OUTPATIENT)
Dept: MAMMOGRAPHY | Age: 72
Discharge: HOME | End: 2022-02-10
Attending: INTERNAL MEDICINE
Payer: MEDICARE

## 2022-02-10 DIAGNOSIS — Z12.31 ENCOUNTER FOR SCREENING MAMMOGRAM FOR MALIGNANT NEOPLASM OF BREAST: ICD-10-CM

## 2022-02-10 PROCEDURE — 77067 SCR MAMMO BI INCL CAD: CPT | Performed by: INTERNAL MEDICINE

## 2022-02-10 PROCEDURE — 77063 BREAST TOMOSYNTHESIS BI: CPT | Performed by: INTERNAL MEDICINE

## 2022-02-24 ENCOUNTER — HOSPITAL ENCOUNTER (OUTPATIENT)
Dept: MAMMOGRAPHY | Facility: HOSPITAL | Age: 72
Discharge: HOME | End: 2022-02-24
Attending: INTERNAL MEDICINE
Payer: MEDICARE

## 2022-02-24 DIAGNOSIS — R92.2 INCONCLUSIVE MAMMOGRAM: ICD-10-CM

## 2022-02-24 PROCEDURE — 77061 BREAST TOMOSYNTHESIS UNI: CPT | Performed by: INTERNAL MEDICINE

## 2022-02-24 PROCEDURE — 76642 ULTRASOUND BREAST LIMITED: CPT | Performed by: INTERNAL MEDICINE

## 2022-02-24 PROCEDURE — 77065 DX MAMMO INCL CAD UNI: CPT | Performed by: INTERNAL MEDICINE

## 2022-03-29 ENCOUNTER — OFFICE VISIT (OUTPATIENT)
Dept: FAMILY MEDICINE CLINIC | Facility: CLINIC | Age: 72
End: 2022-03-29
Payer: MEDICARE

## 2022-03-29 VITALS
OXYGEN SATURATION: 99 % | RESPIRATION RATE: 18 BRPM | DIASTOLIC BLOOD PRESSURE: 79 MMHG | TEMPERATURE: 98 F | HEART RATE: 82 BPM | SYSTOLIC BLOOD PRESSURE: 141 MMHG

## 2022-03-29 DIAGNOSIS — R39.9 UTI SYMPTOMS: Primary | ICD-10-CM

## 2022-03-29 LAB
BILIRUBIN: NEGATIVE
GLUCOSE (URINE DIPSTICK): NEGATIVE MG/DL
KETONES (URINE DIPSTICK): NEGATIVE MG/DL
MULTISTIX LOT#: ABNORMAL NUMERIC
NITRITE, URINE: NEGATIVE
PH, URINE: 5.5 (ref 4.5–8)
PROTEIN (URINE DIPSTICK): 100 MG/DL
SPECIFIC GRAVITY: 1.02 (ref 1–1.03)
URINE-COLOR: YELLOW
UROBILINOGEN,SEMI-QN: 0.2 MG/DL (ref 0–1.9)

## 2022-03-29 PROCEDURE — 87186 SC STD MICRODIL/AGAR DIL: CPT | Performed by: NURSE PRACTITIONER

## 2022-03-29 PROCEDURE — 99213 OFFICE O/P EST LOW 20 MIN: CPT | Performed by: NURSE PRACTITIONER

## 2022-03-29 PROCEDURE — 81003 URINALYSIS AUTO W/O SCOPE: CPT | Performed by: NURSE PRACTITIONER

## 2022-03-29 PROCEDURE — 87086 URINE CULTURE/COLONY COUNT: CPT | Performed by: NURSE PRACTITIONER

## 2022-03-29 PROCEDURE — 87077 CULTURE AEROBIC IDENTIFY: CPT | Performed by: NURSE PRACTITIONER

## 2022-03-29 RX ORDER — NITROFURANTOIN 25; 75 MG/1; MG/1
100 CAPSULE ORAL 2 TIMES DAILY
Qty: 14 CAPSULE | Refills: 0 | Status: SHIPPED | OUTPATIENT
Start: 2022-03-29 | End: 2022-04-05

## 2022-03-31 ENCOUNTER — TELEPHONE (OUTPATIENT)
Dept: FAMILY MEDICINE CLINIC | Facility: CLINIC | Age: 72
End: 2022-03-31

## 2022-03-31 RX ORDER — SULFAMETHOXAZOLE AND TRIMETHOPRIM 800; 160 MG/1; MG/1
1 TABLET ORAL 2 TIMES DAILY
Qty: 6 TABLET | Refills: 0 | Status: SHIPPED | OUTPATIENT
Start: 2022-03-31 | End: 2022-04-03

## 2022-03-31 NOTE — TELEPHONE ENCOUNTER
PT returned call regarding urine culture report. Discussed resistance to macrobid. Switched to bactrim. Pt remains afebrile and states she does feel somewhat better. Verbalized understanding of need to change medication. Questions answered.

## 2022-05-09 ENCOUNTER — TELEPHONE (OUTPATIENT)
Dept: INTERNAL MEDICINE CLINIC | Facility: CLINIC | Age: 72
End: 2022-05-09

## 2022-05-09 ENCOUNTER — LABORATORY ENCOUNTER (OUTPATIENT)
Dept: LAB | Age: 72
End: 2022-05-09
Attending: INTERNAL MEDICINE
Payer: MEDICARE

## 2022-05-09 DIAGNOSIS — E03.8 OTHER SPECIFIED HYPOTHYROIDISM: ICD-10-CM

## 2022-05-09 DIAGNOSIS — Z00.00 ROUTINE GENERAL MEDICAL EXAMINATION AT A HEALTH CARE FACILITY: Primary | ICD-10-CM

## 2022-05-09 DIAGNOSIS — E78.9 BORDERLINE HIGH CHOLESTEROL: ICD-10-CM

## 2022-05-09 DIAGNOSIS — R94.6 BORDERLINE ABNORMAL TFTS: ICD-10-CM

## 2022-05-09 DIAGNOSIS — E78.9 DISORDER OF LIPID METABOLISM: ICD-10-CM

## 2022-05-09 DIAGNOSIS — R00.2 PALPITATIONS: ICD-10-CM

## 2022-05-09 LAB
T4 FREE SERPL-MCNC: 1.4 NG/DL (ref 0.8–1.7)
TSI SER-ACNC: 0.27 MIU/ML (ref 0.36–3.74)

## 2022-05-09 PROCEDURE — 84443 ASSAY THYROID STIM HORMONE: CPT

## 2022-05-09 PROCEDURE — 36415 COLL VENOUS BLD VENIPUNCTURE: CPT

## 2022-05-09 PROCEDURE — 84439 ASSAY OF FREE THYROXINE: CPT

## 2022-05-09 NOTE — TELEPHONE ENCOUNTER
Future Appointments   Date Time Provider Gregg Wilson   8/29/2022 11:00 AM Kaveh Orellana MD [facility]     Orders to    THE [facility]         aware must fast no call back required

## 2022-05-11 ENCOUNTER — TELEPHONE (OUTPATIENT)
Dept: INTERNAL MEDICINE CLINIC | Facility: CLINIC | Age: 72
End: 2022-05-11

## 2022-08-16 ENCOUNTER — LAB ENCOUNTER (OUTPATIENT)
Dept: LAB | Age: 72
End: 2022-08-16
Attending: INTERNAL MEDICINE
Payer: MEDICARE

## 2022-08-16 DIAGNOSIS — E78.9 BORDERLINE HIGH CHOLESTEROL: ICD-10-CM

## 2022-08-16 DIAGNOSIS — E03.8 OTHER SPECIFIED HYPOTHYROIDISM: ICD-10-CM

## 2022-08-16 DIAGNOSIS — Z00.00 ROUTINE GENERAL MEDICAL EXAMINATION AT A HEALTH CARE FACILITY: ICD-10-CM

## 2022-08-16 DIAGNOSIS — R79.89 ABNORMAL TSH: ICD-10-CM

## 2022-08-16 LAB
ALBUMIN SERPL-MCNC: 3.5 G/DL (ref 3.4–5)
ALBUMIN/GLOB SERPL: 1.1 {RATIO} (ref 1–2)
ALP LIVER SERPL-CCNC: 69 U/L
ALT SERPL-CCNC: 19 U/L
ANION GAP SERPL CALC-SCNC: 3 MMOL/L (ref 0–18)
AST SERPL-CCNC: 18 U/L (ref 15–37)
BASOPHILS # BLD AUTO: 0.04 X10(3) UL (ref 0–0.2)
BASOPHILS NFR BLD AUTO: 0.8 %
BILIRUB SERPL-MCNC: 0.5 MG/DL (ref 0.1–2)
BUN BLD-MCNC: 12 MG/DL (ref 7–18)
BUN/CREAT SERPL: 11.3 (ref 10–20)
CALCIUM BLD-MCNC: 8.9 MG/DL (ref 8.5–10.1)
CHLORIDE SERPL-SCNC: 106 MMOL/L (ref 98–112)
CHOLEST SERPL-MCNC: 176 MG/DL (ref ?–200)
CO2 SERPL-SCNC: 30 MMOL/L (ref 21–32)
CREAT BLD-MCNC: 1.06 MG/DL
DEPRECATED RDW RBC AUTO: 39.1 FL (ref 35.1–46.3)
EOSINOPHIL # BLD AUTO: 0.13 X10(3) UL (ref 0–0.7)
EOSINOPHIL NFR BLD AUTO: 2.6 %
ERYTHROCYTE [DISTWIDTH] IN BLOOD BY AUTOMATED COUNT: 11.6 % (ref 11–15)
FASTING PATIENT LIPID ANSWER: YES
FASTING STATUS PATIENT QL REPORTED: YES
GFR SERPLBLD BASED ON 1.73 SQ M-ARVRAT: 56 ML/MIN/1.73M2 (ref 60–?)
GLOBULIN PLAS-MCNC: 3.2 G/DL (ref 2.8–4.4)
GLUCOSE BLD-MCNC: 90 MG/DL (ref 70–99)
HCT VFR BLD AUTO: 47.9 %
HDLC SERPL-MCNC: 57 MG/DL (ref 40–59)
HGB BLD-MCNC: 15.7 G/DL
IMM GRANULOCYTES # BLD AUTO: 0.02 X10(3) UL (ref 0–1)
IMM GRANULOCYTES NFR BLD: 0.4 %
LDLC SERPL CALC-MCNC: 100 MG/DL (ref ?–100)
LYMPHOCYTES # BLD AUTO: 0.97 X10(3) UL (ref 1–4)
LYMPHOCYTES NFR BLD AUTO: 19.1 %
MCH RBC QN AUTO: 30.1 PG (ref 26–34)
MCHC RBC AUTO-ENTMCNC: 32.8 G/DL (ref 31–37)
MCV RBC AUTO: 91.9 FL
MONOCYTES # BLD AUTO: 0.62 X10(3) UL (ref 0.1–1)
MONOCYTES NFR BLD AUTO: 12.2 %
NEUTROPHILS # BLD AUTO: 3.29 X10 (3) UL (ref 1.5–7.7)
NEUTROPHILS # BLD AUTO: 3.29 X10(3) UL (ref 1.5–7.7)
NEUTROPHILS NFR BLD AUTO: 64.9 %
NONHDLC SERPL-MCNC: 119 MG/DL (ref ?–130)
OSMOLALITY SERPL CALC.SUM OF ELEC: 287 MOSM/KG (ref 275–295)
PLATELET # BLD AUTO: 178 10(3)UL (ref 150–450)
POTASSIUM SERPL-SCNC: 4.4 MMOL/L (ref 3.5–5.1)
PROT SERPL-MCNC: 6.7 G/DL (ref 6.4–8.2)
RBC # BLD AUTO: 5.21 X10(6)UL
SODIUM SERPL-SCNC: 139 MMOL/L (ref 136–145)
T4 FREE SERPL-MCNC: 1.2 NG/DL (ref 0.8–1.7)
TRIGL SERPL-MCNC: 105 MG/DL (ref 30–149)
TSI SER-ACNC: 1.58 MIU/ML (ref 0.36–3.74)
VLDLC SERPL CALC-MCNC: 17 MG/DL (ref 0–30)
WBC # BLD AUTO: 5.1 X10(3) UL (ref 4–11)

## 2022-08-16 PROCEDURE — 36415 COLL VENOUS BLD VENIPUNCTURE: CPT

## 2022-08-16 PROCEDURE — 80053 COMPREHEN METABOLIC PANEL: CPT

## 2022-08-16 PROCEDURE — 80061 LIPID PANEL: CPT

## 2022-08-16 PROCEDURE — 85025 COMPLETE CBC W/AUTO DIFF WBC: CPT

## 2022-08-16 PROCEDURE — 84443 ASSAY THYROID STIM HORMONE: CPT

## 2022-08-16 PROCEDURE — 84439 ASSAY OF FREE THYROXINE: CPT

## 2022-08-29 ENCOUNTER — OFFICE VISIT (OUTPATIENT)
Dept: INTERNAL MEDICINE CLINIC | Facility: CLINIC | Age: 72
End: 2022-08-29
Payer: MEDICARE

## 2022-08-29 VITALS
SYSTOLIC BLOOD PRESSURE: 114 MMHG | TEMPERATURE: 98 F | RESPIRATION RATE: 12 BRPM | DIASTOLIC BLOOD PRESSURE: 62 MMHG | HEIGHT: 63.19 IN | HEART RATE: 68 BPM | OXYGEN SATURATION: 100 % | BODY MASS INDEX: 28.11 KG/M2 | WEIGHT: 160.63 LBS

## 2022-08-29 DIAGNOSIS — E78.9 BORDERLINE HIGH CHOLESTEROL: ICD-10-CM

## 2022-08-29 DIAGNOSIS — Z85.828 HISTORY OF BASAL CELL CARCINOMA OF SKIN: ICD-10-CM

## 2022-08-29 DIAGNOSIS — E03.8 OTHER SPECIFIED HYPOTHYROIDISM: ICD-10-CM

## 2022-08-29 DIAGNOSIS — Z00.00 ENCOUNTER FOR ANNUAL WELLNESS EXAM IN MEDICARE PATIENT: Primary | ICD-10-CM

## 2022-08-29 PROBLEM — D12.5 BENIGN NEOPLASM OF SIGMOID COLON: Status: RESOLVED | Noted: 2020-09-02 | Resolved: 2022-08-29

## 2022-08-29 PROBLEM — Z83.719 FAMILY HISTORY OF COLONIC POLYPS: Status: RESOLVED | Noted: 2020-09-02 | Resolved: 2022-08-29

## 2022-08-29 PROBLEM — Z12.11 SCREEN FOR COLON CANCER: Status: RESOLVED | Noted: 2020-02-20 | Resolved: 2022-08-29

## 2022-08-29 PROBLEM — B02.9 HERPES ZOSTER WITHOUT COMPLICATION: Status: RESOLVED | Noted: 2021-08-27 | Resolved: 2022-08-29

## 2022-08-29 PROBLEM — D12.4 BENIGN NEOPLASM OF DESCENDING COLON: Status: RESOLVED | Noted: 2020-09-02 | Resolved: 2022-08-29

## 2022-08-29 PROBLEM — D12.3 BENIGN NEOPLASM OF TRANSVERSE COLON: Status: RESOLVED | Noted: 2020-09-02 | Resolved: 2022-08-29

## 2022-08-29 PROBLEM — Z83.71 FAMILY HISTORY OF COLONIC POLYPS: Status: RESOLVED | Noted: 2020-09-02 | Resolved: 2022-08-29

## 2022-08-29 PROBLEM — Z12.11 SPECIAL SCREENING FOR MALIGNANT NEOPLASM OF COLON: Status: RESOLVED | Noted: 2020-09-02 | Resolved: 2022-08-29

## 2022-08-29 PROCEDURE — 1125F AMNT PAIN NOTED PAIN PRSNT: CPT | Performed by: INTERNAL MEDICINE

## 2022-08-29 PROCEDURE — G0439 PPPS, SUBSEQ VISIT: HCPCS | Performed by: INTERNAL MEDICINE

## 2022-08-29 RX ORDER — LEVOTHYROXINE SODIUM 0.1 MG/1
100 TABLET ORAL
Qty: 90 TABLET | Refills: 3 | Status: SHIPPED | OUTPATIENT
Start: 2022-08-29

## 2022-09-05 ENCOUNTER — PATIENT MESSAGE (OUTPATIENT)
Dept: INTERNAL MEDICINE CLINIC | Facility: CLINIC | Age: 72
End: 2022-09-05

## 2022-09-05 DIAGNOSIS — Z78.0 POST-MENOPAUSAL: Primary | ICD-10-CM

## 2022-09-06 NOTE — TELEPHONE ENCOUNTER
From: Naseem Randle  To: Theodore Simeon MD  Sent: 9/5/2022 7:25 AM CDT  Subject: Bone Density test    Dr. Michelle Licona,  I changed my mine regarding the Bone Density test. Instead of waiting 3 years, I decided to continue with the 2 year follow up. I will schedule an appointment in October at the [facility] on [address] [place] [address]. I will need a script to set up an appointment. Thank you.   Rhea Marie

## 2022-10-10 ENCOUNTER — HOSPITAL ENCOUNTER (OUTPATIENT)
Dept: BONE DENSITY | Age: 72
Discharge: HOME | End: 2022-10-10
Attending: INTERNAL MEDICINE
Payer: MEDICARE

## 2022-10-10 DIAGNOSIS — Z78.0 POST-MENOPAUSAL: ICD-10-CM

## 2022-10-10 PROCEDURE — 77080 DXA BONE DENSITY AXIAL: CPT | Performed by: INTERNAL MEDICINE

## 2023-01-16 ENCOUNTER — TELEPHONE (OUTPATIENT)
Dept: INTERNAL MEDICINE CLINIC | Facility: CLINIC | Age: 73
End: 2023-01-16

## 2023-01-16 DIAGNOSIS — Z12.31 ENCOUNTER FOR SCREENING MAMMOGRAM FOR MALIGNANT NEOPLASM OF BREAST: Primary | ICD-10-CM

## 2023-01-17 NOTE — TELEPHONE ENCOUNTER
Called and spoke to pt. Informed her mammo order has been placed. Can call central scheduling to schedule at location of her choice. Pt said she is in the car currently so cannot take down number. Informed her will send via [facility]. Pt started asking question, but phone disconnected. Called pt back and phone disconnected again. Called pt a third time, pt apologetic. Stated she will just call us back when she gets home and she can be on a different phone.

## 2023-02-28 ENCOUNTER — HOSPITAL ENCOUNTER (OUTPATIENT)
Dept: MAMMOGRAPHY | Facility: HOSPITAL | Age: 73
Discharge: HOME | End: 2023-02-28
Attending: INTERNAL MEDICINE
Payer: MEDICARE

## 2023-02-28 DIAGNOSIS — Z12.31 ENCOUNTER FOR SCREENING MAMMOGRAM FOR MALIGNANT NEOPLASM OF BREAST: ICD-10-CM

## 2023-02-28 PROCEDURE — 77067 SCR MAMMO BI INCL CAD: CPT | Performed by: INTERNAL MEDICINE

## 2023-02-28 PROCEDURE — 77063 BREAST TOMOSYNTHESIS BI: CPT | Performed by: INTERNAL MEDICINE

## 2023-03-27 ENCOUNTER — OFFICE VISIT (OUTPATIENT)
Dept: INTERNAL MEDICINE CLINIC | Facility: CLINIC | Age: 73
End: 2023-03-27
Payer: MEDICARE

## 2023-03-27 ENCOUNTER — LAB ENCOUNTER (OUTPATIENT)
Dept: LAB | Age: 73
End: 2023-03-27
Attending: INTERNAL MEDICINE
Payer: MEDICARE

## 2023-03-27 ENCOUNTER — HOSPITAL ENCOUNTER (OUTPATIENT)
Dept: GENERAL RADIOLOGY | Age: 73
Discharge: HOME | End: 2023-03-27
Attending: INTERNAL MEDICINE
Payer: MEDICARE

## 2023-03-27 VITALS
HEIGHT: 63 IN | WEIGHT: 159 LBS | BODY MASS INDEX: 28.17 KG/M2 | SYSTOLIC BLOOD PRESSURE: 108 MMHG | TEMPERATURE: 97 F | RESPIRATION RATE: 16 BRPM | HEART RATE: 72 BPM | DIASTOLIC BLOOD PRESSURE: 64 MMHG

## 2023-03-27 DIAGNOSIS — R53.83 MALAISE AND FATIGUE: Primary | ICD-10-CM

## 2023-03-27 DIAGNOSIS — E55.9 VITAMIN D DEFICIENCY: ICD-10-CM

## 2023-03-27 DIAGNOSIS — G89.29 CHRONIC LEFT SHOULDER PAIN: ICD-10-CM

## 2023-03-27 DIAGNOSIS — M25.512 CHRONIC LEFT SHOULDER PAIN: ICD-10-CM

## 2023-03-27 DIAGNOSIS — R53.81 MALAISE AND FATIGUE: ICD-10-CM

## 2023-03-27 DIAGNOSIS — R10.32 LEFT GROIN PAIN: ICD-10-CM

## 2023-03-27 DIAGNOSIS — R20.0 NUMBNESS AND TINGLING IN LEFT HAND: ICD-10-CM

## 2023-03-27 DIAGNOSIS — R20.2 NUMBNESS AND TINGLING IN LEFT HAND: ICD-10-CM

## 2023-03-27 DIAGNOSIS — Z83.3 FAMILY HISTORY OF DIABETES MELLITUS: ICD-10-CM

## 2023-03-27 DIAGNOSIS — M54.50 LOW BACK PAIN WITH RADIATION: ICD-10-CM

## 2023-03-27 DIAGNOSIS — R53.81 MALAISE AND FATIGUE: Primary | ICD-10-CM

## 2023-03-27 DIAGNOSIS — R53.83 MALAISE AND FATIGUE: ICD-10-CM

## 2023-03-27 DIAGNOSIS — R73.9 BLOOD GLUCOSE ELEVATED: ICD-10-CM

## 2023-03-27 DIAGNOSIS — R20.0 NUMBNESS OF TOES: ICD-10-CM

## 2023-03-27 LAB
ALBUMIN SERPL-MCNC: 3.6 G/DL (ref 3.4–5)
ALBUMIN/GLOB SERPL: 1.2 {RATIO} (ref 1–2)
ALP LIVER SERPL-CCNC: 63 U/L
ALT SERPL-CCNC: 25 U/L
ANION GAP SERPL CALC-SCNC: 5 MMOL/L (ref 0–18)
AST SERPL-CCNC: 21 U/L (ref 15–37)
BASOPHILS # BLD AUTO: 0.04 X10(3) UL (ref 0–0.2)
BASOPHILS NFR BLD AUTO: 0.6 %
BILIRUB SERPL-MCNC: 0.4 MG/DL (ref 0.1–2)
BUN BLD-MCNC: 15 MG/DL (ref 7–18)
CALCIUM BLD-MCNC: 9 MG/DL (ref 8.5–10.1)
CHLORIDE SERPL-SCNC: 106 MMOL/L (ref 98–112)
CO2 SERPL-SCNC: 30 MMOL/L (ref 21–32)
CREAT BLD-MCNC: 1.06 MG/DL
EOSINOPHIL # BLD AUTO: 0.13 X10(3) UL (ref 0–0.7)
EOSINOPHIL NFR BLD AUTO: 2 %
ERYTHROCYTE [DISTWIDTH] IN BLOOD BY AUTOMATED COUNT: 11.9 %
EST. AVERAGE GLUCOSE BLD GHB EST-MCNC: 111 MG/DL (ref 68–126)
FASTING STATUS PATIENT QL REPORTED: NO
GFR SERPLBLD BASED ON 1.73 SQ M-ARVRAT: 55 ML/MIN/1.73M2 (ref 60–?)
GLOBULIN PLAS-MCNC: 3.1 G/DL (ref 2.8–4.4)
GLUCOSE BLD-MCNC: 92 MG/DL (ref 70–99)
HBA1C MFR BLD: 5.5 % (ref ?–5.7)
HCT VFR BLD AUTO: 47 %
HGB BLD-MCNC: 15.4 G/DL
IMM GRANULOCYTES # BLD AUTO: 0.03 X10(3) UL (ref 0–1)
IMM GRANULOCYTES NFR BLD: 0.5 %
LYMPHOCYTES # BLD AUTO: 1.12 X10(3) UL (ref 1–4)
LYMPHOCYTES NFR BLD AUTO: 17.5 %
MCH RBC QN AUTO: 29.7 PG (ref 26–34)
MCHC RBC AUTO-ENTMCNC: 32.8 G/DL (ref 31–37)
MCV RBC AUTO: 90.7 FL
MONOCYTES # BLD AUTO: 0.66 X10(3) UL (ref 0.1–1)
MONOCYTES NFR BLD AUTO: 10.3 %
NEUTROPHILS # BLD AUTO: 4.41 X10 (3) UL (ref 1.5–7.7)
NEUTROPHILS # BLD AUTO: 4.41 X10(3) UL (ref 1.5–7.7)
NEUTROPHILS NFR BLD AUTO: 69.1 %
OSMOLALITY SERPL CALC.SUM OF ELEC: 292 MOSM/KG (ref 275–295)
PLATELET # BLD AUTO: 220 10(3)UL (ref 150–450)
POTASSIUM SERPL-SCNC: 4.1 MMOL/L (ref 3.5–5.1)
PROT SERPL-MCNC: 6.7 G/DL (ref 6.4–8.2)
RBC # BLD AUTO: 5.18 X10(6)UL
SODIUM SERPL-SCNC: 141 MMOL/L (ref 136–145)
TSI SER-ACNC: 0.51 MIU/ML (ref 0.36–3.74)
VIT B12 SERPL-MCNC: 861 PG/ML (ref 193–986)
VIT D+METAB SERPL-MCNC: 35.1 NG/ML (ref 30–100)
WBC # BLD AUTO: 6.4 X10(3) UL (ref 4–11)

## 2023-03-27 PROCEDURE — 36415 COLL VENOUS BLD VENIPUNCTURE: CPT

## 2023-03-27 PROCEDURE — 72110 X-RAY EXAM L-2 SPINE 4/>VWS: CPT | Performed by: INTERNAL MEDICINE

## 2023-03-27 PROCEDURE — 80053 COMPREHEN METABOLIC PANEL: CPT

## 2023-03-27 PROCEDURE — 83036 HEMOGLOBIN GLYCOSYLATED A1C: CPT

## 2023-03-27 PROCEDURE — 85025 COMPLETE CBC W/AUTO DIFF WBC: CPT

## 2023-03-27 PROCEDURE — 73502 X-RAY EXAM HIP UNI 2-3 VIEWS: CPT | Performed by: INTERNAL MEDICINE

## 2023-03-27 PROCEDURE — 99214 OFFICE O/P EST MOD 30 MIN: CPT | Performed by: INTERNAL MEDICINE

## 2023-03-27 PROCEDURE — 82607 VITAMIN B-12: CPT

## 2023-03-27 PROCEDURE — 82306 VITAMIN D 25 HYDROXY: CPT

## 2023-03-27 PROCEDURE — 73030 X-RAY EXAM OF SHOULDER: CPT | Performed by: INTERNAL MEDICINE

## 2023-03-27 PROCEDURE — 84443 ASSAY THYROID STIM HORMONE: CPT

## 2023-03-28 ENCOUNTER — PATIENT MESSAGE (OUTPATIENT)
Dept: INTERNAL MEDICINE CLINIC | Facility: CLINIC | Age: 73
End: 2023-03-28

## 2023-03-28 NOTE — TELEPHONE ENCOUNTER
From: Vito Eddy  To: Mica Caceres MD  Sent: 3/28/2023 9:08 AM CDT  Subject: Physical Theraphy    Dr. Chris Whitehead,  After reviewing results, I'm relieved it doesn't appear anything major. However, as we discussed physical therapy would be beneficial. How do I proceed setting up an appointment with a physical therapist?   Thank you.   Brendan De Los Santos

## 2023-04-24 ENCOUNTER — OFFICE VISIT (OUTPATIENT)
Dept: PHYSICAL THERAPY | Facility: HOSPITAL | Age: 73
End: 2023-04-24
Attending: INTERNAL MEDICINE
Payer: MEDICARE

## 2023-04-24 DIAGNOSIS — R10.32 LEFT GROIN PAIN: ICD-10-CM

## 2023-04-24 DIAGNOSIS — M54.50 LOW BACK PAIN WITH RADIATION: ICD-10-CM

## 2023-04-24 PROCEDURE — 97110 THERAPEUTIC EXERCISES: CPT

## 2023-04-24 PROCEDURE — 97162 PT EVAL MOD COMPLEX 30 MIN: CPT

## 2023-04-28 ENCOUNTER — OFFICE VISIT (OUTPATIENT)
Dept: PHYSICAL THERAPY | Facility: HOSPITAL | Age: 73
End: 2023-04-28
Attending: INTERNAL MEDICINE
Payer: MEDICARE

## 2023-04-28 PROCEDURE — 97140 MANUAL THERAPY 1/> REGIONS: CPT

## 2023-04-28 PROCEDURE — 97110 THERAPEUTIC EXERCISES: CPT

## 2023-05-01 ENCOUNTER — OFFICE VISIT (OUTPATIENT)
Dept: PHYSICAL THERAPY | Facility: HOSPITAL | Age: 73
End: 2023-05-01
Attending: INTERNAL MEDICINE
Payer: MEDICARE

## 2023-05-01 PROCEDURE — 97110 THERAPEUTIC EXERCISES: CPT

## 2023-05-01 PROCEDURE — 97140 MANUAL THERAPY 1/> REGIONS: CPT

## 2023-05-05 ENCOUNTER — OFFICE VISIT (OUTPATIENT)
Dept: PHYSICAL THERAPY | Facility: HOSPITAL | Age: 73
End: 2023-05-05
Attending: INTERNAL MEDICINE
Payer: MEDICARE

## 2023-05-05 PROCEDURE — 97110 THERAPEUTIC EXERCISES: CPT

## 2023-05-05 PROCEDURE — 97140 MANUAL THERAPY 1/> REGIONS: CPT

## 2023-05-08 ENCOUNTER — OFFICE VISIT (OUTPATIENT)
Dept: PHYSICAL THERAPY | Facility: HOSPITAL | Age: 73
End: 2023-05-08
Attending: INTERNAL MEDICINE
Payer: MEDICARE

## 2023-05-08 PROCEDURE — 97140 MANUAL THERAPY 1/> REGIONS: CPT

## 2023-05-08 PROCEDURE — 97110 THERAPEUTIC EXERCISES: CPT

## 2023-05-12 ENCOUNTER — OFFICE VISIT (OUTPATIENT)
Dept: PHYSICAL THERAPY | Facility: HOSPITAL | Age: 73
End: 2023-05-12
Attending: INTERNAL MEDICINE
Payer: MEDICARE

## 2023-05-12 PROCEDURE — 97110 THERAPEUTIC EXERCISES: CPT

## 2023-05-12 PROCEDURE — 97140 MANUAL THERAPY 1/> REGIONS: CPT

## 2023-06-16 ENCOUNTER — OFFICE VISIT (OUTPATIENT)
Dept: INTERNAL MEDICINE CLINIC | Facility: CLINIC | Age: 73
End: 2023-06-16
Payer: MEDICARE

## 2023-06-16 VITALS
WEIGHT: 161.63 LBS | HEART RATE: 64 BPM | TEMPERATURE: 98 F | BODY MASS INDEX: 28.64 KG/M2 | HEIGHT: 63 IN | OXYGEN SATURATION: 98 % | DIASTOLIC BLOOD PRESSURE: 64 MMHG | RESPIRATION RATE: 18 BRPM | SYSTOLIC BLOOD PRESSURE: 124 MMHG

## 2023-06-16 DIAGNOSIS — M12.9 ARTHRITIS INVOLVING MULTIPLE SITES: Primary | ICD-10-CM

## 2023-06-16 DIAGNOSIS — R39.89 SENSATION OF PRESSURE IN BLADDER AREA: ICD-10-CM

## 2023-06-16 LAB
APPEARANCE: CLEAR
BILIRUBIN: NEGATIVE
GLUCOSE (URINE DIPSTICK): NEGATIVE MG/DL
KETONES (URINE DIPSTICK): NEGATIVE MG/DL
LEUKOCYTES: NEGATIVE
MULTISTIX LOT#: NORMAL NUMERIC
NITRITE, URINE: NEGATIVE
OCCULT BLOOD: NEGATIVE
PH, URINE: 6 (ref 4.5–8)
PROTEIN (URINE DIPSTICK): NEGATIVE MG/DL
SPECIFIC GRAVITY: 1.02 (ref 1–1.03)
URINE-COLOR: YELLOW
UROBILINOGEN,SEMI-QN: 0.2 MG/DL (ref 0–1.9)

## 2023-06-16 PROCEDURE — 81003 URINALYSIS AUTO W/O SCOPE: CPT | Performed by: INTERNAL MEDICINE

## 2023-06-16 PROCEDURE — 99213 OFFICE O/P EST LOW 20 MIN: CPT | Performed by: INTERNAL MEDICINE

## 2023-06-16 PROCEDURE — 1126F AMNT PAIN NOTED NONE PRSNT: CPT | Performed by: INTERNAL MEDICINE

## 2023-07-12 ENCOUNTER — HOSPITAL ENCOUNTER (OUTPATIENT)
Dept: ULTRASOUND IMAGING | Age: 73
Discharge: HOME | End: 2023-07-12
Attending: INTERNAL MEDICINE
Payer: MEDICARE

## 2023-07-12 DIAGNOSIS — R39.89 SENSATION OF PRESSURE IN BLADDER AREA: ICD-10-CM

## 2023-07-12 PROCEDURE — 76770 US EXAM ABDO BACK WALL COMP: CPT | Performed by: INTERNAL MEDICINE

## 2023-08-28 ENCOUNTER — OFFICE VISIT (OUTPATIENT)
Dept: FAMILY MEDICINE CLINIC | Facility: CLINIC | Age: 73
End: 2023-08-28
Payer: MEDICARE

## 2023-08-28 VITALS
SYSTOLIC BLOOD PRESSURE: 137 MMHG | HEART RATE: 67 BPM | BODY MASS INDEX: 28.35 KG/M2 | DIASTOLIC BLOOD PRESSURE: 78 MMHG | TEMPERATURE: 97 F | HEIGHT: 63 IN | OXYGEN SATURATION: 99 % | RESPIRATION RATE: 16 BRPM | WEIGHT: 160 LBS

## 2023-08-28 DIAGNOSIS — L98.9 SKIN LESION OF LEFT ARM: ICD-10-CM

## 2023-08-28 DIAGNOSIS — B02.9 HERPES ZOSTER WITHOUT COMPLICATION: Primary | ICD-10-CM

## 2023-08-28 RX ORDER — VALACYCLOVIR HYDROCHLORIDE 1 G/1
1000 TABLET, FILM COATED ORAL 3 TIMES DAILY
Qty: 21 TABLET | Refills: 0 | Status: SHIPPED | OUTPATIENT
Start: 2023-08-28 | End: 2023-09-04

## 2023-09-10 ENCOUNTER — ORDER TRANSCRIPTION (OUTPATIENT)
Dept: ADMINISTRATIVE | Facility: HOSPITAL | Age: 73
End: 2023-09-10

## 2023-09-10 DIAGNOSIS — Z13.6 SCREENING FOR CARDIOVASCULAR CONDITION: Primary | ICD-10-CM

## 2023-09-12 ENCOUNTER — OFFICE VISIT (OUTPATIENT)
Dept: INTERNAL MEDICINE CLINIC | Facility: CLINIC | Age: 73
End: 2023-09-12
Payer: MEDICARE

## 2023-09-12 ENCOUNTER — LAB ENCOUNTER (OUTPATIENT)
Dept: LAB | Age: 73
End: 2023-09-12
Attending: INTERNAL MEDICINE
Payer: MEDICARE

## 2023-09-12 VITALS
SYSTOLIC BLOOD PRESSURE: 118 MMHG | TEMPERATURE: 98 F | BODY MASS INDEX: 28 KG/M2 | OXYGEN SATURATION: 96 % | DIASTOLIC BLOOD PRESSURE: 62 MMHG | WEIGHT: 162 LBS | RESPIRATION RATE: 18 BRPM | HEIGHT: 63.78 IN | HEART RATE: 66 BPM

## 2023-09-12 DIAGNOSIS — Z00.00 ENCOUNTER FOR MEDICARE ANNUAL WELLNESS EXAM: Primary | ICD-10-CM

## 2023-09-12 DIAGNOSIS — R39.89 SENSATION OF PRESSURE IN BLADDER AREA: ICD-10-CM

## 2023-09-12 DIAGNOSIS — E78.00 PURE HYPERCHOLESTEROLEMIA: ICD-10-CM

## 2023-09-12 DIAGNOSIS — E03.8 OTHER SPECIFIED HYPOTHYROIDISM: ICD-10-CM

## 2023-09-12 DIAGNOSIS — B02.9 HERPES ZOSTER WITHOUT COMPLICATION: ICD-10-CM

## 2023-09-12 DIAGNOSIS — N18.31 STAGE 3A CHRONIC KIDNEY DISEASE (HCC): ICD-10-CM

## 2023-09-12 DIAGNOSIS — E78.9 BORDERLINE HIGH CHOLESTEROL: ICD-10-CM

## 2023-09-12 DIAGNOSIS — N28.9 RENAL INSUFFICIENCY: ICD-10-CM

## 2023-09-12 DIAGNOSIS — Z12.31 ENCOUNTER FOR SCREENING MAMMOGRAM FOR MALIGNANT NEOPLASM OF BREAST: ICD-10-CM

## 2023-09-12 DIAGNOSIS — M12.9 ARTHRITIS INVOLVING MULTIPLE SITES: ICD-10-CM

## 2023-09-12 LAB
ANION GAP SERPL CALC-SCNC: 3 MMOL/L (ref 0–18)
BUN BLD-MCNC: 11 MG/DL (ref 7–18)
CALCIUM BLD-MCNC: 8.9 MG/DL (ref 8.5–10.1)
CHLORIDE SERPL-SCNC: 106 MMOL/L (ref 98–112)
CHOLEST SERPL-MCNC: 203 MG/DL (ref ?–200)
CO2 SERPL-SCNC: 30 MMOL/L (ref 21–32)
CREAT BLD-MCNC: 0.94 MG/DL
EGFRCR SERPLBLD CKD-EPI 2021: 64 ML/MIN/1.73M2 (ref 60–?)
FASTING PATIENT LIPID ANSWER: YES
FASTING STATUS PATIENT QL REPORTED: YES
GLUCOSE BLD-MCNC: 90 MG/DL (ref 70–99)
HDLC SERPL-MCNC: 72 MG/DL (ref 40–59)
LDLC SERPL CALC-MCNC: 117 MG/DL (ref ?–100)
NONHDLC SERPL-MCNC: 131 MG/DL (ref ?–130)
OSMOLALITY SERPL CALC.SUM OF ELEC: 287 MOSM/KG (ref 275–295)
POTASSIUM SERPL-SCNC: 4.4 MMOL/L (ref 3.5–5.1)
SODIUM SERPL-SCNC: 139 MMOL/L (ref 136–145)
TRIGL SERPL-MCNC: 78 MG/DL (ref 30–149)
TSI SER-ACNC: 2.54 MIU/ML (ref 0.36–3.74)
VLDLC SERPL CALC-MCNC: 14 MG/DL (ref 0–30)

## 2023-09-12 PROCEDURE — G0439 PPPS, SUBSEQ VISIT: HCPCS | Performed by: INTERNAL MEDICINE

## 2023-09-12 PROCEDURE — 1126F AMNT PAIN NOTED NONE PRSNT: CPT | Performed by: INTERNAL MEDICINE

## 2023-09-12 PROCEDURE — 80048 BASIC METABOLIC PNL TOTAL CA: CPT

## 2023-09-12 PROCEDURE — 80061 LIPID PANEL: CPT

## 2023-09-12 PROCEDURE — 84443 ASSAY THYROID STIM HORMONE: CPT

## 2023-09-12 PROCEDURE — 36415 COLL VENOUS BLD VENIPUNCTURE: CPT

## 2023-09-12 RX ORDER — LEVOTHYROXINE SODIUM 0.1 MG/1
100 TABLET ORAL
Qty: 90 TABLET | Refills: 3 | Status: SHIPPED | OUTPATIENT
Start: 2023-09-12

## 2023-09-14 ENCOUNTER — TELEPHONE (OUTPATIENT)
Dept: INTERNAL MEDICINE CLINIC | Facility: CLINIC | Age: 73
End: 2023-09-14

## 2023-09-14 NOTE — TELEPHONE ENCOUNTER
Brand name not covered by insurance-do you want to send in generic or send new rx or start PA for brand name-generic is covered-ok to send in new generic?        levothyroxine (SYNTHROID) 100 MCG Oral Tab       Ref #[id number]

## 2023-09-16 ENCOUNTER — PATIENT MESSAGE (OUTPATIENT)
Dept: INTERNAL MEDICINE CLINIC | Facility: CLINIC | Age: 73
End: 2023-09-16

## 2023-09-16 DIAGNOSIS — R79.89 ABNORMAL TSH: Primary | ICD-10-CM

## 2023-09-18 ENCOUNTER — HOSPITAL ENCOUNTER (OUTPATIENT)
Dept: CT IMAGING | Facility: HOSPITAL | Age: 73
End: 2023-09-18
Attending: INTERNAL MEDICINE

## 2023-09-18 VITALS
SYSTOLIC BLOOD PRESSURE: 122 MMHG | DIASTOLIC BLOOD PRESSURE: 70 MMHG | WEIGHT: 160 LBS | HEIGHT: 63.78 IN | BODY MASS INDEX: 27.65 KG/M2

## 2023-09-18 DIAGNOSIS — Z13.6 SCREENING FOR CARDIOVASCULAR CONDITION: ICD-10-CM

## 2023-09-18 RX ORDER — LEVOTHYROXINE SODIUM 0.1 MG/1
100 TABLET ORAL
Qty: 90 TABLET | Refills: 2 | Status: SHIPPED | OUTPATIENT
Start: 2023-09-18

## 2023-10-27 ENCOUNTER — HOSPITAL ENCOUNTER (OUTPATIENT)
Dept: ULTRASOUND IMAGING | Age: 73
Discharge: HOME | End: 2023-10-27
Attending: INTERNAL MEDICINE

## 2023-10-27 DIAGNOSIS — Z13.6 SCREENING FOR CARDIOVASCULAR CONDITION: ICD-10-CM

## 2023-11-05 ENCOUNTER — IMMUNIZATION (OUTPATIENT)
Dept: FAMILY MEDICINE CLINIC | Facility: CLINIC | Age: 73
End: 2023-11-05
Payer: MEDICARE

## 2023-11-05 DIAGNOSIS — Z23 NEED FOR INFLUENZA VACCINATION: Primary | ICD-10-CM

## 2023-11-05 PROCEDURE — 90686 IIV4 VACC NO PRSV 0.5 ML IM: CPT | Performed by: FAMILY MEDICINE

## 2023-11-05 PROCEDURE — G0008 ADMIN INFLUENZA VIRUS VAC: HCPCS | Performed by: FAMILY MEDICINE

## 2023-12-01 ENCOUNTER — HOSPITAL ENCOUNTER (OUTPATIENT)
Dept: ULTRASOUND IMAGING | Facility: HOSPITAL | Age: 73
Discharge: HOME | End: 2023-12-01
Attending: INTERNAL MEDICINE
Payer: MEDICARE

## 2023-12-01 ENCOUNTER — OFFICE VISIT (OUTPATIENT)
Dept: INTERNAL MEDICINE CLINIC | Facility: CLINIC | Age: 73
End: 2023-12-01
Payer: MEDICARE

## 2023-12-01 ENCOUNTER — TELEPHONE (OUTPATIENT)
Dept: INTERNAL MEDICINE CLINIC | Facility: CLINIC | Age: 73
End: 2023-12-01

## 2023-12-01 VITALS
BODY MASS INDEX: 28.92 KG/M2 | WEIGHT: 161.19 LBS | RESPIRATION RATE: 18 BRPM | HEART RATE: 66 BPM | HEIGHT: 62.6 IN | TEMPERATURE: 99 F | OXYGEN SATURATION: 99 % | SYSTOLIC BLOOD PRESSURE: 124 MMHG | DIASTOLIC BLOOD PRESSURE: 68 MMHG

## 2023-12-01 DIAGNOSIS — M79.605 LEFT LEG PAIN: Primary | ICD-10-CM

## 2023-12-01 DIAGNOSIS — M79.605 LEFT LEG PAIN: ICD-10-CM

## 2023-12-01 DIAGNOSIS — M25.562 LEFT KNEE PAIN, UNSPECIFIED CHRONICITY: ICD-10-CM

## 2023-12-01 PROCEDURE — 99213 OFFICE O/P EST LOW 20 MIN: CPT | Performed by: INTERNAL MEDICINE

## 2023-12-01 PROCEDURE — 1125F AMNT PAIN NOTED PAIN PRSNT: CPT | Performed by: INTERNAL MEDICINE

## 2023-12-01 PROCEDURE — 93971 EXTREMITY STUDY: CPT | Performed by: INTERNAL MEDICINE

## 2023-12-01 NOTE — TELEPHONE ENCOUNTER
Luz w/ Tamela at Radiology.     Impression   CONCLUSION:  Negative for deep venous thrombosis of the left lower extremity       SINCERE MOORE

## 2023-12-11 ENCOUNTER — TELEPHONE (OUTPATIENT)
Dept: INTERNAL MEDICINE CLINIC | Facility: CLINIC | Age: 73
End: 2023-12-11

## 2023-12-11 NOTE — TELEPHONE ENCOUNTER
Pt stopped in office to drop off an Attending Physician form  Statement that needs to be filled out as pt cancelled cruise and this form is needed for the insurance. Placed in TB folder up front.

## 2024-02-13 ENCOUNTER — TELEPHONE (OUTPATIENT)
Facility: CLINIC | Age: 74
End: 2024-02-13

## 2024-02-13 ENCOUNTER — HOSPITAL ENCOUNTER (OUTPATIENT)
Dept: GENERAL RADIOLOGY | Age: 74
Discharge: HOME | End: 2024-02-13
Attending: PHYSICIAN ASSISTANT
Payer: MEDICARE

## 2024-02-13 ENCOUNTER — OFFICE VISIT (OUTPATIENT)
Facility: CLINIC | Age: 74
End: 2024-02-13
Payer: MEDICARE

## 2024-02-13 VITALS — HEIGHT: 63 IN | BODY MASS INDEX: 28.35 KG/M2 | WEIGHT: 160 LBS

## 2024-02-13 DIAGNOSIS — M25.562 LEFT KNEE PAIN, UNSPECIFIED CHRONICITY: Primary | ICD-10-CM

## 2024-02-13 DIAGNOSIS — M76.32 ILIOTIBIAL BAND TENDONITIS, LEFT: Primary | ICD-10-CM

## 2024-02-13 DIAGNOSIS — M25.562 LEFT KNEE PAIN, UNSPECIFIED CHRONICITY: ICD-10-CM

## 2024-02-13 PROCEDURE — 73564 X-RAY EXAM KNEE 4 OR MORE: CPT | Performed by: PHYSICIAN ASSISTANT

## 2024-02-13 PROCEDURE — 1160F RVW MEDS BY RX/DR IN RCRD: CPT | Performed by: PHYSICIAN ASSISTANT

## 2024-02-13 PROCEDURE — 3008F BODY MASS INDEX DOCD: CPT | Performed by: PHYSICIAN ASSISTANT

## 2024-02-13 PROCEDURE — 99213 OFFICE O/P EST LOW 20 MIN: CPT | Performed by: PHYSICIAN ASSISTANT

## 2024-02-13 PROCEDURE — 1159F MED LIST DOCD IN RCRD: CPT | Performed by: PHYSICIAN ASSISTANT

## 2024-02-13 NOTE — H&P
[facility] New Patient Note    CC:   Chief Complaint   Patient presents with    Knee Pain     Left knee pain starting January . Left leg issues starting in November . No known injury. Patient states she uses Advil and tylenol as needed . Patient did ice at one point. Patient notes pain is off and on . Pain occurs with walking , climbing stairs and getting in and out of car . Laying down helps with pain        HPI: This 73 year old female presents today with complaints of left knee pain.  Patient reports development of lateral thigh pain on the left side beginning around Thanksgiving in November.  She reports that has persisted despite use of Advil, Tylenol, rest.  She finds pain worsens with walking uphill, and ascending stairs, rising from a seated position.  She describes the pain as cramping along the lateral thigh and now the pain has been radiating to the anterior knee and the anterior shin.  She did participate in physical therapy for her back but has not yet done so for her hip.    Past Medical History:   Diagnosis Date    Basal cell carcinoma     Hypothyroidism      Past Surgical History:   Procedure Laterality Date    THYROIDECTOMY      THYROIDECTOMY       Current Outpatient Medications   Medication Sig Dispense Refill    levothyroxine 100 MCG Oral Tab Take 1 tablet (100 mcg total) by mouth before breakfast. 90 tablet 2    Multiple Vitamins-Minerals (MULTI-VITAMIN/MINERALS) Oral Tab Take 1 tablet by mouth daily.      CALCIUM OR Take by mouth.       Allergies   Allergen Reactions    Penicillins ITCHING     As child       Family History   Problem Relation Age of Onset    Other (Other) Father         cirrohosis    Stroke Mother     Other (Other) Mother         pneumonia     Social History     Occupational History    Not on file   Tobacco Use    Smoking status: Never    Smokeless tobacco: Never   Vaping Use    Vaping Use: Never used   Substance and Sexual Activity    Alcohol use: Not Currently    Drug  use: Never    Sexual activity: Not on file        ROS:  Comprehensive system review obtained and negative except as mentioned above      Physical Exam:    Ht 5' 3\" (1.6 m)   Wt 160 lb (72.6 kg)   LMP  (LMP Unknown)   BMI 28.34 kg/m²   Constitutional: Awake, alert, no distress.   Psychological: Appropriate affect.  Respiratory: Unlabored breathing.  Left lower extremity:  Inspection: skin is intact without any redness, deformity, or effusion.   Palpation: Tender to palpation diffusely across the IT band.  There is also tenderness palpation along the lateral joint line of the left knee.  Range of motion: Knee can fully extend to 0 degrees and flex to approximately 130 degrees.  Knee is stable to valgus and varus stress at 0 and 30 degrees. No laxity with anterior or posterior drawer.  Rod's test positive for IT band tightness affecting the left side.  Neuromuscular: Strength is normal and sensation is intact.  Vascular: Extremities are warm and well-perfused.  Lymph: Unremarkable.      Imaging: Imaging was personally viewed, independently interpreted and radiology report read.  Radiographs of left knee obtained today demonstrates mild bilateral compartmental joint space narrowing with osteophytic lipping.      Assessment/Plan:  Diagnoses and all orders for this visit:    Iliotibial band tendonitis, left  -     OP REFERRAL TO [facility]      Assessment: 73-year-old female with symptoms most consistent with tendinitis of the IT band on the left side    Plan: I thoroughly discussed with the patient nonsurgical treatment options for her pain.  I highly recommended working with a physical therapist to target the IT band with stretches, strengthening exercises, and modalities for pain given the chronicity of her symptoms.  PT referral was written.  I encouraged her to continue with use of Advil as needed to help with inflammatory pain.  She may resume activities as tolerated.  If pain increases  during physical therapy and patient is seeking a prescription NSAID she need only contact our office.  She can follow-up with our clinic as needed.  Her questions were sought and answered satisfactorily.  She is happy with the plan will follow as advised.      Erick Anthony PA-C  [facility]    This note was dictated using Dragon software.  While it was briefly proofread prior to completion, some grammatical, spelling, and word choice errors due to dictation may still occur.

## 2024-02-13 NOTE — TELEPHONE ENCOUNTER
Xr's ordered for upcoming appointments     Patient aware to arrive early to complete images before appointment

## 2024-02-16 ENCOUNTER — TELEPHONE (OUTPATIENT)
Dept: PHYSICAL THERAPY | Facility: HOSPITAL | Age: 74
End: 2024-02-16

## 2024-02-20 ENCOUNTER — OFFICE VISIT (OUTPATIENT)
Dept: PHYSICAL THERAPY | Age: 74
End: 2024-02-20
Attending: PHYSICIAN ASSISTANT
Payer: MEDICARE

## 2024-02-20 DIAGNOSIS — M76.32 ILIOTIBIAL BAND TENDONITIS, LEFT: Primary | ICD-10-CM

## 2024-02-20 PROCEDURE — 97110 THERAPEUTIC EXERCISES: CPT

## 2024-02-20 PROCEDURE — 97161 PT EVAL LOW COMPLEX 20 MIN: CPT

## 2024-02-20 NOTE — PROGRESS NOTES
LOWER EXTREMITY EVALUATION:     Diagnosis:   Iliotibial band tendonitis, left (M76.32)      Referring Provider: Raj  Date of Evaluation:    2/20/2024    Precautions:  Drug Allergy, Cancer Next MD visit:   none scheduled  Date of Surgery: n/a     PATIENT SUMMARY   Magda Isabel is a 73 year old female who presents to therapy today with complaints of L knee pain & pressure with some cramping & discomfort along lateral aspect of upper thigh. Is having a tingling feeling which is not sure if from circulation issue from varicose veins - noted in L foot only.    Hx: Pt reports symptoms started right before Thanksgiving with pain along lateral aspect of L upper thigh. Does report an episode where the knee did lock up & pop when walking. No falls. Pt reports she normally is a walker, tries to walk 3 mi daily (has been told to go down to 2 mi). Reports hx low back issues including bulging disc. Was unable to get in<>out of car, stair climb. Ended up taking Advil & Tylenol. Symptoms were not resolving however. Pt ended up stopping her walking altogether. Tried to start back up but discomfort noted. Her knee then started hurting, thinks maybe could be due to the way she was walking. The side of her leg has gotten better, but her knee has now been hurting more. Pt reports she does stretches for her low back. Feels her muscles are tight. Reports arthritis to the hips & low back.    Pt describes pain level current 0/10, at best 0/10, at worst 5/10.   Current functional limitations include sit to stand, stair ascent, stair descent, walking on incline, pivoting while walking, walking.     Magda describes prior level of function: no hx of current symptoms, though reports hx of LBP with PT (not currently experiencing LBP). Pt goals include \"want to walk up & down stairs & walk without the pain & pressure.\"  Past medical history was reviewed with Magda. Significant findings include  has a past medical history of Basal cell  carcinoma and Hypothyroidism.    XR KNEE, COMPLETE (4 OR MORE VIEWS), LEFT (CPT=73564)    Result Date: 2/13/2024  CONCLUSION:   Negative for fracture or malalignment.  Normal mineralization.  Mild joint space loss of the lateral compartment with tricompartmental osteophytes.  No patellar subluxation.  No joint effusion or focal soft tissue swelling.   LOCATION:  [facility]   Dictated by (CST): Jaja Salcedo MD on 2/13/2024 at 1:19 PM     Finalized by (CST): Jaja Salcedo MD on 2/13/2024 at 1:20 PM       US VENOUS DOPPLER LEG LEFT - DIAG IMG (CPT=93971)    Result Date: 12/1/2023  CONCLUSION:  Negative for deep venous thrombosis of the left lower extremity   LOCATION:  [id number]    Dictated by (CST): Jaja Salcedo MD on 12/01/2023 at 2:45 PM     Finalized by (CST): Jaja Salcedo MD on 12/01/2023 at 2:46 PM        ASSESSMENT  Magda presents to physical therapy evaluation with primary c/o L knee pain & pressure with some cramping & discomfort along lateral aspect of upper thigh. The results of the objective tests and measures show impairments in posture, gait, ROM, joint mobility, soft tissue mobility, strength, flexibility. Functional deficits include but are not limited to sit to stand, stair ascent, stair descent, walking on incline, pivoting while walking, walking. Signs and symptoms are consistent with diagnosis of Iliotibial band tendonitis, left (M76.32). Pt and PT discussed evaluation findings, pathology, POC and HEP. Pt voiced understanding and performs HEP correctly without reported pain. Skilled Physical Therapy is medically necessary to address the above impairments and reach functional goals.     OBJECTIVE:   Observation: Slower/ more hesitant with sit to stand. Hallux valgus L>R, pes planus L>R, genu valgum B  Palpation: TTP along distal aspect of L ITB (with mild increased tension) & L greater trochanter    ROM: (* denotes performed with pain)  Hip PROM Knee AROM   ER: R mod restricted; L mod restricted  IR: R carina  restricted; L carina restricted Flexion: R WNL; L WNL  Extension: R WNL; L WNL        Accessory motion: hypomob L pat-fem med & sup<>inf glides & L tib-fem post/ IR    Flexibility:  Hamstrings: R mod restricted; L mod restricted  Piriformis: R mod restricted; L mod restricted    Strength/MMT: (* denotes performed with pain)  Hip Knee   Abduction: R 3/5; L 3/5  ER: R 4+/5; L 4/5 Flexion: R 5/5; L 5/5  Extension: R 5/5; L 5/5        Special tests:   Pelvic alignment: symmetrical    Gait: pt ambulates on level ground with mild varied crossover gait pattern  Balance: defer    Today’s Treatment and Response:   Pt education was provided on exam findings, treatment diagnosis, treatment plan, expectations, and prognosis. Pt was also provided recommendations for possible soreness after evaluation, modalities as needed [ice/heat], and importance of remaining active.    Patient was instructed in and issued a HEP for:  Access Code: [email or web address]  URL: [email or web address]  Date: 02/20/2024  Prepared by: Constance Hernandez    Exercises  - Hooklying Isometric Hip Abduction with Belt  - 1 x daily - 4 x weekly - 1-2 sets - 10 reps - 5 sec hold (Green TheraBand)  - Supine Hip Adduction Isometric with Ball  - 1 x daily - 4 x weekly - 1-2 sets - 10 reps - 5 sec hold    May continue with LTR (prior low back HEP exercise) if relief is noted (defer if pain)  Trial of piriformis & ITB stretch - defer 2/2 increased inguinal pain    Charges: PT Eval Low Complexity, Therex x 1      Total Timed Treatment: 15 min     Total Treatment Time: 45 min     PLAN OF CARE:    Goals: (to be met in 10 visits)  Pt will improve L hip abduction strength to at least 4-/5 for stair negotiation  Pt will improve L hip ER strength to at least 4+/5 for community ambulation without crossover gait pattern  P will demonstrate the ability to rise to stand from sitting with ease  Pt will report pain at worst 2-3/10 with pivoting while walking   Pt will be independent and  compliant with comprehensive HEP to maintain progress achieved in PT    Frequency / Duration: Patient will be seen for 1-2 x/week or a total of 10 visits over a 90 day period. Treatment will include: Gait training, Manual Therapy, Neuromuscular Re-education, Self-Care Home Management, Therapeutic Activities, Therapeutic Exercise, Home Exercise Program instruction, and Modalities to include: Electrical stimulation (unattended) and Ultrasound    Education or treatment limitation: None  Rehab Potential:excellent    LEFS Score  LEFS Score: 61.25 % (2/13/2024  4:18 PM)    Patient/Family/Caregiver was advised of these findings, precautions, and treatment options and has agreed to actively participate in planning and for this course of care.    Thank you for your referral. Please co-sign or sign and return this letter via fax as soon as possible to [phone]. If you have any questions, please contact me at Dept: [phone]    Sincerely,  Electronically signed by therapist: Constance Hernandez, PT    Physician's certification required: Yes  I certify the need for these services furnished under this plan of treatment and while under my care.    X___________________________________________________ Date____________________    Certification From: 2/20/2024  To:5/20/2024

## 2024-02-20 NOTE — PATIENT INSTRUCTIONS
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 She subsequently completed her Doctor of Physical Therapy from [employer] in 2013. Constance’s experience is primarily with orthopedics, but also has experience in pediatrics as well.     Constance has been a certified provider of the [employer] instrument-assisted soft tissue mobilization since 2015 & has further earned the title of [employer] Specialist in 2020. Constance is continuing her passion for learning by pursuing training through the [facility] to effectively treat patients with pelvic floor related disorders. 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    Constance thrives on treating patients with empathy & compassion to provide the individualized care that all patients need & deserve. Constance aims to empower patients with the tools to feel in charge of their recovery, knowing they can advocate for themselves & achieve maximum success when they understand the treatments that work best for them.    When Constance is not at work, she enjoys exercising with [employer] classes, scenic walks outside, baking, & traveling with her family.     Constance is accepting new patients at the [facility], [place], operated by [facility]. To schedule or change an appointment, call [phone]. To speak with Constance, call [phone] or message on [employer].                    Access Code: [email or web address]  URL: [email or web address]  Date: 02/20/2024  Prepared by: Constance Hernandez    Exercises  - Hooklying Isometric Hip Abduction with Belt  - 1 x daily - 4 x weekly - 1-2 sets - 10 reps - 5 sec hold  - Supine Hip Adduction  Isometric with Ball  - 1 x daily - 4 x weekly - 1-2 sets - 10 reps - 5 sec hold          Hi Magda- it was great to meet you today & I look forward to work together to achieve your goals. As a reminder, I will be out of office from Thursday 2/22 & returning Monday 2/26: please contact central scheduling if any issues arise during this time. Thank you!

## 2024-02-28 ENCOUNTER — OFFICE VISIT (OUTPATIENT)
Dept: PHYSICAL THERAPY | Age: 74
End: 2024-02-28
Attending: PHYSICIAN ASSISTANT
Payer: MEDICARE

## 2024-02-28 PROCEDURE — 97140 MANUAL THERAPY 1/> REGIONS: CPT

## 2024-02-28 PROCEDURE — 97110 THERAPEUTIC EXERCISES: CPT

## 2024-02-28 NOTE — PROGRESS NOTES
Diagnosis:   Iliotibial band tendonitis, left (M76.32)      Referring Provider: Raj  Date of Evaluation:    2/20/2024    Precautions:  Drug Allergy, Cancer Next MD visit:   none scheduled  Date of Surgery: n/a   Insurance Primary/Secondary: BLUE CROSS MCR / N/A     # Auth Visits: n/a            Subjective: \"I have good days & bad days\" - HEP compliant every other day completing 2 sets in the morning & 2 sets at night; the other days did walking. Continues to have issues on stairs - frustrating. Got new mattress Saturday.    Pain: 0/10      Objective: See Flowsheet for details      Assessment: Excellent tolerance to introduction of NuStep. Deferred further reps of green pilates ring for hip abduction isometric in hooklying as unable to find position of comfort on lateral aspect of thigh even with position modification (both mid & low aspect of thighs); better tolerance to use of the green TheraBand with folded towels placed in between band & thigh for added cushioning support.      Goals: (to be met in 10 visits)  Pt will improve L hip abduction strength to at least 4-/5 for stair negotiation  Pt will improve L hip ER strength to at least 4+/5 for community ambulation without crossover gait pattern  P will demonstrate the ability to rise to stand from sitting with ease  Pt will report pain at worst 2-3/10 with pivoting while walking   Pt will be independent and compliant with comprehensive HEP to maintain progress achieved in PT    Plan: Plan for progression of new stretching & strengthening exercise next visit with potential for HEP update.  Date: 2/28/2024  TX#: 2/10 Date:                 TX#: 3/ Date:                 TX#: 4/ Date:                 TX#: 5/ Date:   Tx#: 6/   Therex: 30'  Education: expectation for change in symptoms - keep PT posted to see if any modifications needed; potential for normal soreness after PT today  NuStep L1 x 6'  Slantboard gastroc stretch 3x30\" B  Hooklying hip Add Kirsten with  yellow ball 5\" 2x10  Hooklying hip Add Kirsten: 5\" x 5 with green pilates ring (with modification of placement), 5\" x 20 with Green TB with towel support lateral aspect B thigh  Manual Therapy: 12'  In sidelying with pillow between LE: STM L ITB (very light), L pat-fem med glide gr I-II       HEP:   Access Code: [email or web address]  URL: [email or web address]  Date: 02/20/2024  Prepared by: Constance Hernandez    Exercises  - Hooklying Isometric Hip Abduction with Belt  - 1 x daily - 4 x weekly - 1-2 sets - 10 reps - 5 sec hold (Green TheraBand)  - Supine Hip Adduction Isometric with Ball  - 1 x daily - 4 x weekly - 1-2 sets - 10 reps - 5 sec hold    Charges: Therex x 2, Manual x 1       Total Timed Treatment: 42 min  Total Treatment Time: 42 min

## 2024-03-01 ENCOUNTER — OFFICE VISIT (OUTPATIENT)
Dept: PHYSICAL THERAPY | Age: 74
End: 2024-03-01
Attending: PHYSICIAN ASSISTANT
Payer: MEDICARE

## 2024-03-01 ENCOUNTER — HOSPITAL ENCOUNTER (OUTPATIENT)
Dept: MAMMOGRAPHY | Facility: HOSPITAL | Age: 74
Discharge: HOME | End: 2024-03-01
Attending: INTERNAL MEDICINE
Payer: MEDICARE

## 2024-03-01 DIAGNOSIS — Z12.31 ENCOUNTER FOR SCREENING MAMMOGRAM FOR MALIGNANT NEOPLASM OF BREAST: ICD-10-CM

## 2024-03-01 PROCEDURE — 77063 BREAST TOMOSYNTHESIS BI: CPT | Performed by: INTERNAL MEDICINE

## 2024-03-01 PROCEDURE — 77067 SCR MAMMO BI INCL CAD: CPT | Performed by: INTERNAL MEDICINE

## 2024-03-01 PROCEDURE — 97140 MANUAL THERAPY 1/> REGIONS: CPT

## 2024-03-01 PROCEDURE — 97110 THERAPEUTIC EXERCISES: CPT

## 2024-03-01 NOTE — PROGRESS NOTES
Diagnosis:   Iliotibial band tendonitis, left (M76.32)      Referring Provider: Raj  Date of Evaluation:    2/20/2024    Precautions:  Drug Allergy, Cancer Next MD visit:   none scheduled  Date of Surgery: n/a   Insurance Primary/Secondary: BLUE CROSS [facility] / N/A     # Auth Visits: n/a            Subjective: Pt reports relief from the manual therapy last visit but only temporary.    Pain: 1/10      Objective: See Flowsheet for details      Assessment: Excellent tolerance to new stretching & strength progressions with HEP updated to reflect. Discussed goal to strengthen & stretch surrounding tissue in order to best treat contribution to pt's symptoms. Concurrent with manual treatment: discussed need for supportive footwear with walking.      Goals: (to be met in 10 visits)  Pt will improve L hip abduction strength to at least 4-/5 for stair negotiation  Pt will improve L hip ER strength to at least 4+/5 for community ambulation without crossover gait pattern  P will demonstrate the ability to rise to stand from sitting with ease  Pt will report pain at worst 2-3/10 with pivoting while walking   Pt will be independent and compliant with comprehensive HEP to maintain progress achieved in PT    Plan: Plan for progression of new stretching & strengthening exercises with goal of avoiding pain provocation; assess for feedback following HEP update  Date: 2/28/2024  TX#: 2/10 Date: 3/1/2024  TX#: 3/10 Date:                 TX#: 4/ Date:                 TX#: 5/ Date:   Tx#: 6/   Therex: 30'  Education: expectation for change in symptoms - keep PT posted to see if any modifications needed; potential for normal soreness after PT today  NuStep L1 x 6'  Slantboard gastroc stretch 3x30\" B  Hooklying hip Add Kirsten with yellow ball 5\" 2x10  Hooklying hip Add Kirsten: 5\" x 5 with green pilates ring (with modification of placement), 5\" x 20 with Green TB with towel support lateral aspect B thigh  Manual Therapy: 12'  In sidelying with  pillow between LE: STM L ITB (very light), L pat-fem med glide gr I-II Therex: 35'  NuStep L1 x 6'  Slantboard gastroc stretch 3x30\" B  Active HS stretch L 5x10\" - HEP  Modified figure-4 piriformis stretch 3x30\" L - HEP  Crossover piriformis stretch 3x30\" L - HEP  Supine ITB/ lat HS stretch with strap 3x30\" L - HEP  Clams 3x10 L - HEP  POC/ Discharge Planning  Manual Therapy: 8'  In sidelying with pillow between LE: STM L ITB (very light), L pat-fem med glide gr I-II      HEP:   Access Code: [email or web address]  URL: [email or web address]  Date: 02/20/2024  Prepared by: Constance Hernandez    Exercises  - Hooklying Isometric Hip Abduction with Belt  - 1 x daily - 4 x weekly - 1-2 sets - 10 reps - 5 sec hold (Green TheraBand)  - Supine Hip Adduction Isometric with Ball  - 1 x daily - 4 x weekly - 1-2 sets - 10 reps - 5 sec hold      Access Code: [email or web address]  URL: [email or web address]  Date: 03/01/2024  Prepared by: Constance Hernandez    Program Notes  Able to modify as needed with goal of avoiding increased pain provocation.    Exercises  - Clamshell  - 1 x daily - 4 x weekly - 2-3 sets - 10 reps - 1-2 sec hold  - Hooklying Active Hamstring Stretch  - 1-2 x daily - 5-6 x weekly - 5 reps - 10 sec hold  - Supine Piriformis Stretch with Foot on Ground  - 1-2 x daily - 5-6 x weekly - 3 sets - 30 sec hold  - Supine Figure 4 Piriformis Stretch  - 1-2 x daily - 5-6 x weekly - 3 sets - 30 sec hold    Charges: Therex x 2, Manual x 1       Total Timed Treatment: 43 min  Total Treatment Time: 43 min

## 2024-03-05 ENCOUNTER — OFFICE VISIT (OUTPATIENT)
Dept: PHYSICAL THERAPY | Age: 74
End: 2024-03-05
Attending: PHYSICIAN ASSISTANT
Payer: MEDICARE

## 2024-03-05 PROCEDURE — 97110 THERAPEUTIC EXERCISES: CPT

## 2024-03-05 NOTE — PATIENT INSTRUCTIONS
Access Code: [email or web address]  URL: [email or web address]  Date: 03/05/2024  Prepared by: Constance Hernandez    Exercises  - Seated Hip External Rotation with Resistance  - 1 x daily - 3 x weekly - 2 sets - 10 reps  - Sidelying Hip External Rotation on Table  - 1 x daily - 3 x weekly - 2 sets - 10 reps  - Sidelying Hip Abduction  - 1 x daily - 3 x weekly - 1-2 sets - 10 reps  - Supine Lower Trunk Rotation  - 1 x daily - 7 x weekly - 1 sets - 5-10 reps - 5 sec hold  - Standing Hip Abduction with Counter Support  - 1 x daily - 3 x weekly - 1-2 sets - 10 reps  - Seated Hamstring Stretch  - 1 x daily - 7 x weekly - 3 sets - 30 sec hold  - Squat with Chair and Counter Support  - 1 x daily - 3 x weekly - 1 sets - 10 reps

## 2024-03-05 NOTE — PROGRESS NOTES
Diagnosis:   Iliotibial band tendonitis, left (M76.32)      Referring Provider: Raj  Date of Evaluation:    2/20/2024    Precautions:  Drug Allergy, Cancer Next MD visit:   none scheduled  Date of Surgery: n/a   Insurance Primary/Secondary: BLUE CROSS MCR / N/A     # Auth Visits: n/a            Subjective: Pt reports feeling better physically & mentally. Some lateral hip muscle soreness - not pain. May be ready to keep next visit her last.    Pain: 1/10      Objective: See Flowsheet for details      Assessment: Pt initially cautious to complete squat tap to tableside with min manual cues to maintain proper knee-toe alignment without genu valgum & hip IR/ Add with cues to engage more hip hinge. Able to prevent compensatory trunk lean & hip hike with standing hip abd raises.      Goals: (to be met in 10 visits)  Pt will improve L hip abduction strength to at least 4-/5 for stair negotiation  Pt will improve L hip ER strength to at least 4+/5 for community ambulation without crossover gait pattern  P will demonstrate the ability to rise to stand from sitting with ease  Pt will report pain at worst 2-3/10 with pivoting while walking   Pt will be independent and compliant with comprehensive HEP to maintain progress achieved in PT    Plan: Tentative plan for discharge next visit upon discussion with pt.  Date: 2/28/2024  TX#: 2/10 Date: 3/1/2024  TX#: 3/10 Date: 3/5/2024  TX#: 4/10 Date:                 TX#: 5/ Date:   Tx#: 6/   Therex: 30'  Education: expectation for change in symptoms - keep PT posted to see if any modifications needed; potential for normal soreness after PT today  NuStep L1 x 6'  Slantboard gastroc stretch 3x30\" B  Hooklying hip Add Kirsten with yellow ball 5\" 2x10  Hooklying hip Add Kirsten: 5\" x 5 with green pilates ring (with modification of placement), 5\" x 20 with Green TB with towel support lateral aspect B thigh  Manual Therapy: 12'  In sidelying with pillow between LE: STM L ITB (very light), L  pat-fem med glide gr I-II Therex: 35'  NuStep L1 x 6'  Slantboard gastroc stretch 3x30\" B  Active HS stretch L 5x10\" - HEP  Modified figure-4 piriformis stretch 3x30\" L - HEP  Crossover piriformis stretch 3x30\" L - HEP  Supine ITB/ lat HS stretch with strap 3x30\" L - HEP  Clams 3x10 L - HEP  POC/ Discharge Planning  Manual Therapy: 8'  In sidelying with pillow between LE: STM L ITB (very light), L pat-fem med glide gr I-II Therex: 43'  POC options, discharge planning  Crossover piriformis stretch review/ form check  Seated L hip ER with Green TB x 20 - HEP  S/L L hip ER (bottom leg up) x 20 - HEP  S/L hip Abd x 15 - HEP  LTR wide base for more hip bias 5\" x 10 ea - HEP  Standing hip Abd in // bars with HHA 2x10 ea  Squat tap tableside (knee flex to ~ 60 deg) x 10 - HEP  Seated HS stretch 3x30\" L - HEP  Education: normal muscle soreness with new ex's today       HEP:   Access Code: [email or web address]  URL: [email or web address]  Date: 02/20/2024  Prepared by: Constance Hernandez    Exercises  - Hooklying Isometric Hip Abduction with Belt  - 1 x daily - 4 x weekly - 1-2 sets - 10 reps - 5 sec hold (Green TheraBand)  - Supine Hip Adduction Isometric with Ball  - 1 x daily - 4 x weekly - 1-2 sets - 10 reps - 5 sec hold      Access Code: [email or web address]  URL: [email or web address]  Date: 03/01/2024  Prepared by: Constance Hernandez    Program Notes  Able to modify as needed with goal of avoiding increased pain provocation.    Exercises  - Clamshell  - 1 x daily - 4 x weekly - 2-3 sets - 10 reps - 1-2 sec hold  - Hooklying Active Hamstring Stretch  - 1-2 x daily - 5-6 x weekly - 5 reps - 10 sec hold  - Supine Piriformis Stretch with Foot on Ground  - 1-2 x daily - 5-6 x weekly - 3 sets - 30 sec hold  - Supine Figure 4 Piriformis Stretch  - 1-2 x daily - 5-6 x weekly - 3 sets - 30 sec hold    Access Code: [email or web address]  URL: [email or web address]  Date: 03/05/2024  Prepared by: Constance Hernandez    Exercises  - Seated Hip External Rotation with  Resistance  - 1 x daily - 3 x weekly - 2 sets - 10 reps  - Sidelying Hip External Rotation on Table  - 1 x daily - 3 x weekly - 2 sets - 10 reps  - Sidelying Hip Abduction  - 1 x daily - 3 x weekly - 1-2 sets - 10 reps  - Supine Lower Trunk Rotation  - 1 x daily - 7 x weekly - 1 sets - 5-10 reps - 5 sec hold  - Standing Hip Abduction with Counter Support  - 1 x daily - 3 x weekly - 1-2 sets - 10 reps  - Seated Hamstring Stretch  - 1 x daily - 7 x weekly - 3 sets - 30 sec hold  - Squat with Chair and Counter Support  - 1 x daily - 3 x weekly - 1 sets - 10 reps    Charges: Therex x 3      Total Timed Treatment: 43 min  Total Treatment Time: 43 min

## 2024-03-06 ENCOUNTER — APPOINTMENT (OUTPATIENT)
Dept: PHYSICAL THERAPY | Age: 74
End: 2024-03-06
Attending: PHYSICIAN ASSISTANT
Payer: MEDICARE

## 2024-03-08 ENCOUNTER — OFFICE VISIT (OUTPATIENT)
Dept: PHYSICAL THERAPY | Age: 74
End: 2024-03-08
Attending: PHYSICIAN ASSISTANT
Payer: MEDICARE

## 2024-03-08 PROCEDURE — 97110 THERAPEUTIC EXERCISES: CPT

## 2024-03-08 NOTE — PROGRESS NOTES
Diagnosis:   Iliotibial band tendonitis, left (M76.32)      Referring Provider: Raj  Date of Evaluation:    2/20/2024    Precautions:  Drug Allergy, Cancer Next MD visit:   none scheduled  Date of Surgery: n/a   Insurance Primary/Secondary: BLUE CROSS MCR / N/A     # Auth Visits: n/a             Discharge Summary  Pt has attended 5 visits in Physical Therapy.     Subjective: Pt reports increased ache 2/2 weather. Only 1 exercise could not complete for home. Lateral muscle soreness after the leg raise.  Able to go up/ down stairs now, so happy: it is getting better. Feels ready for today to be her last day. Does not have the pressure & cramping like she did. Pt describes pain level at worst 1-2/10. Current functional limitations include standing after sitting > 1-2 hours.  Pain: low back 2/10      Objective: See Flowsheet for details  3/8/2024  Observation: Able to rise from chair with sit to stand WNL.  Palpation: mild TTP along mid aspect of L>R ITB & L>R greater trochanter    Strength/MMT: (* denotes performed with pain)  Hip   Abduction: L 4-/5  ER: L 4+/5     Gait: pt ambulates on level ground with very mild intermittent crossover gait pattern, with reduced frequency      Assessment: Pt has completed 5 visits of skilled PT & reports readiness for discharge. Pt has met almost all therapy goals. Pt presents with improved strength, soft tissue mobility, gait, functional mobility/ transfers (sit to stand), & has reduced pain along with improved function. Pt is appropriate to transition to HEP. Pt verbalizes understanding w/ HEP & ability to self-alter as appropriate. Pt advised to contact PT if questions/ concerns arise while performing HEP. Pt advised to follow up with referring provider and/ or PCP if symptoms increase despite adherence to HEP. Pt is aware that if symptoms recur or increase, pt may be appropriate to return to skilled PT under new POC w/ updated RX if deemed medically necessary. Pt is in  agreement with discharge plans. Thank you.      Goals: (to be met in 10 visits)  Pt will improve L hip abduction strength to at least 4-/5 for stair negotiation - MET  Pt will improve L hip ER strength to at least 4+/5 for community ambulation without crossover gait pattern - almost MET (strength has met, gait has improved)  P will demonstrate the ability to rise to stand from sitting with ease - MET  Pt will report pain at worst 2-3/10 with pivoting while walking - MET  Pt will be independent and compliant with comprehensive HEP to maintain progress achieved in PT - MET    Plan: Discharge to HEP.  LEFS Score  LEFS Score: 61.25 % (2/13/2024  4:18 PM)    Post LEFS Score  Post LEFS Score: 93.75 % (3/8/2024  2:59 PM)    32.5 % improvement    Patient/Family/Caregiver was advised of these findings, precautions, and treatment options and has agreed to actively participate in planning and for this course of care.    Thank you for your referral. If you have any questions, please contact me at Dept: [phone].    Sincerely,  Electronically signed by therapist: Constance Hernandez, PT     Physician's certification required:  No  Please co-sign or sign and return this letter via fax as soon as possible to [phone].   I certify the need for these services furnished under this plan of treatment and while under my care.    X___________________________________________________ Date____________________    Certification From: 3/8/2024  To:6/6/2024     Date: 2/28/2024  TX#: 2/10 Date: 3/1/2024  TX#: 3/10 Date: 3/5/2024  TX#: 4/10 Date: 3/8/2024  TX#: 5/10  Discharge   Therex: 30'  Education: expectation for change in symptoms - keep PT posted to see if any modifications needed; potential for normal soreness after PT today  NuStep L1 x 6'  Slantboard gastroc stretch 3x30\" B  Hooklying hip Add Kirsten with yellow ball 5\" 2x10  Hooklying hip Add Kirsten: 5\" x 5 with green pilates ring (with modification of placement), 5\" x 20 with Green TB with  towel support lateral aspect B thigh  Manual Therapy: 12'  In sidelying with pillow between LE: STM L ITB (very light), L pat-fem med glide gr I-II Therex: 35'  NuStep L1 x 6'  Slantboard gastroc stretch 3x30\" B  Active HS stretch L 5x10\" - HEP  Modified figure-4 piriformis stretch 3x30\" L - HEP  Crossover piriformis stretch 3x30\" L - HEP  Supine ITB/ lat HS stretch with strap 3x30\" L - HEP  Clams 3x10 L - HEP  POC/ Discharge Planning  Manual Therapy: 8'  In sidelying with pillow between LE: STM L ITB (very light), L pat-fem med glide gr I-II Therex: 43'  POC options, discharge planning  Crossover piriformis stretch review/ form check  Seated L hip ER with Green TB x 20 - HEP  S/L L hip ER (bottom leg up) x 20 - HEP  S/L hip Abd x 15 - HEP  LTR wide base for more hip bias 5\" x 10 ea - HEP  Standing hip Abd in // bars with HHA 2x10 ea  Squat tap tableside (knee flex to ~ 60 deg) x 10 - HEP  Seated HS stretch 3x30\" L - HEP  Education: normal muscle soreness with new ex's today   Therex: 43'  HEP review: including recommending pt perform strengthening/ stretching ex's prn for long-term management especially for joint protection with continued walking/ exercise  -keep bottom leg bent for sidelying hip Abd  -ok to defer the \"bottom leg up\" clams  -ok to sit down & get back up with squat  -review supine active HS stretch, seated HS stretch  -defer any exercise that creates pain (if present) - difference between pain & muscle soreness  Re-assessment  Discharge Instructions  NuStep L1 x 6'  Hooklying Hip Abd Kirsten with Blue TheraBand 5\" x 30; with Gray TheraBand x 10  -education in options for HEP with progressions  Post LEFS survey   HEP:   Access Code: [email or web address]  URL: [email or web address]  Date: 02/20/2024  Prepared by: Constance Hernandez    Exercises  - Hooklying Isometric Hip Abduction with Belt  - 1 x daily - 4 x weekly - 1-2 sets - 10 reps - 5 sec hold (Green TheraBand)  - Supine Hip Adduction Isometric with Ball  - 1 x  daily - 4 x weekly - 1-2 sets - 10 reps - 5 sec hold      Access Code: [email or web address]  URL: [email or web address]  Date: 03/01/2024  Prepared by: Constance Hernandez    Program Notes  Able to modify as needed with goal of avoiding increased pain provocation.    Exercises  - Clamshell  - 1 x daily - 4 x weekly - 2-3 sets - 10 reps - 1-2 sec hold  - Hooklying Active Hamstring Stretch  - 1-2 x daily - 5-6 x weekly - 5 reps - 10 sec hold  - Supine Piriformis Stretch with Foot on Ground  - 1-2 x daily - 5-6 x weekly - 3 sets - 30 sec hold  - Supine Figure 4 Piriformis Stretch  - 1-2 x daily - 5-6 x weekly - 3 sets - 30 sec hold    Access Code: [email or web address]  URL: [email or web address]  Date: 03/05/2024  Prepared by: Constance Hernandez    Exercises  - Seated Hip External Rotation with Resistance  - 1 x daily - 3 x weekly - 2 sets - 10 reps  - Sidelying Hip External Rotation on Table  - 1 x daily - 3 x weekly - 2 sets - 10 reps  - Sidelying Hip Abduction  - 1 x daily - 3 x weekly - 1-2 sets - 10 reps  - Supine Lower Trunk Rotation  - 1 x daily - 7 x weekly - 1 sets - 5-10 reps - 5 sec hold  - Standing Hip Abduction with Counter Support  - 1 x daily - 3 x weekly - 1-2 sets - 10 reps  - Seated Hamstring Stretch  - 1 x daily - 7 x weekly - 3 sets - 30 sec hold  - Squat with Chair and Counter Support  - 1 x daily - 3 x weekly - 1 sets - 10 reps    Charges: Therex x 3      Total Timed Treatment: 43 min  Total Treatment Time: 43 min

## 2024-03-11 ENCOUNTER — TELEPHONE (OUTPATIENT)
Dept: INTERNAL MEDICINE CLINIC | Facility: CLINIC | Age: 74
End: 2024-03-11

## 2024-03-11 DIAGNOSIS — Z00.00 ROUTINE GENERAL MEDICAL EXAMINATION AT A HEALTH CARE FACILITY: Primary | ICD-10-CM

## 2024-03-11 DIAGNOSIS — B02.9 HERPES ZOSTER WITHOUT COMPLICATION: ICD-10-CM

## 2024-03-11 DIAGNOSIS — R79.89 ABNORMAL TSH: ICD-10-CM

## 2024-03-15 ENCOUNTER — HOSPITAL ENCOUNTER (OUTPATIENT)
Dept: MAMMOGRAPHY | Facility: HOSPITAL | Age: 74
Discharge: HOME | End: 2024-03-15
Attending: INTERNAL MEDICINE
Payer: MEDICARE

## 2024-03-15 DIAGNOSIS — R92.8 OTHER ABNORMAL AND INCONCLUSIVE FINDINGS ON DIAGNOSTIC IMAGING OF BREAST: ICD-10-CM

## 2024-03-15 DIAGNOSIS — R92.1 CALCIFICATION OF LEFT BREAST: ICD-10-CM

## 2024-03-15 PROCEDURE — 77061 BREAST TOMOSYNTHESIS UNI: CPT | Performed by: INTERNAL MEDICINE

## 2024-03-15 PROCEDURE — 77065 DX MAMMO INCL CAD UNI: CPT | Performed by: INTERNAL MEDICINE

## 2024-03-15 PROCEDURE — 76642 ULTRASOUND BREAST LIMITED: CPT | Performed by: INTERNAL MEDICINE

## 2024-03-15 NOTE — IMAGING NOTE
Magda Isabel is recommended for a  stereotactic biopsy of the left breast by Dr.Nimesh Fung.    History Calcification of left breast   Findings- Left breast grouped amorphous calcifications   Recommendation-STEREOTACTIC BREAST BIOPSY: LEFT BREAST    See EMR for complete imaging report    Medications and allergies reviewed:  Current Outpatient Medications   Medication Sig Dispense Refill    levothyroxine 100 MCG Oral Tab Take 1 tablet (100 mcg total) by mouth before breakfast. 90 tablet 2    Multiple Vitamins-Minerals (MULTI-VITAMIN/MINERALS) Oral Tab Take 1 tablet by mouth daily.      CALCIUM OR Take by mouth.     The following allergy was reported-  PenicillinsITCHING    Magda Isabel denies the use of prescribed anticoagulants, denies known bleeding disorders and/or liver disease, denies chemotherapy    Procedure explained and questions answered.   Magda Maame provided with written educational material.     Ms. Isabel instructed to take 1000 mg of acetaminophen on the day of the biopsy, eat a light meal, and bring or wear a sport bra.  Post biopsy care and instruction reviewed: including no lifting more than five pounds, no upper body exercise, icing of biopsy site, no submerging in water.  Magdadustin Isabel verbalized understanding.    Order in [facility].  Ms. Isabel provided with an appointment-Friday, March 22 at 1030.  Appointment confirmed with breast center .

## 2024-03-22 ENCOUNTER — HOSPITAL ENCOUNTER (OUTPATIENT)
Dept: MAMMOGRAPHY | Facility: HOSPITAL | Age: 74
Discharge: HOME | End: 2024-03-22
Attending: INTERNAL MEDICINE
Payer: MEDICARE

## 2024-03-22 DIAGNOSIS — R92.1 BREAST CALCIFICATIONS: ICD-10-CM

## 2024-03-22 PROCEDURE — 19081 BX BREAST 1ST LESION STRTCTC: CPT | Performed by: INTERNAL MEDICINE

## 2024-03-22 PROCEDURE — 88305 TISSUE EXAM BY PATHOLOGIST: CPT | Performed by: INTERNAL MEDICINE

## 2024-03-25 ENCOUNTER — TELEPHONE (OUTPATIENT)
Dept: MAMMOGRAPHY | Facility: HOSPITAL | Age: 74
End: 2024-03-25

## 2024-03-25 NOTE — TELEPHONE ENCOUNTER
Telephoned Magda Isabel and name,  verified with patient. Notified Magda Isabel of left breast negative for malignancy biopsy result. [facility]  pending.  Magda Isabel reports biopsy site is healing well. Patient instructed to follow up with her physician for future breast imaging recommendations. Pt verbalized understanding and had no further questions at this time.

## 2024-04-08 ENCOUNTER — PATIENT MESSAGE (OUTPATIENT)
Dept: INTERNAL MEDICINE CLINIC | Facility: CLINIC | Age: 74
End: 2024-04-08

## 2024-04-08 RX ORDER — LEVOTHYROXINE SODIUM 100 MCG
100 TABLET ORAL
Qty: 90 TABLET | Refills: 1 | Status: SHIPPED | OUTPATIENT
Start: 2024-04-08

## 2024-04-08 NOTE — TELEPHONE ENCOUNTER
From: Magda Isabel  To: Suzanne Mancia  Sent: 4/8/2024 9:38 AM CDT  Subject: Medication (Insurance coverage changes)    Dr. Mancia, I will try to explain this. The Synthroid I was taking all this time was actually not a generic pill even though the prescription label on the bottle listed the generic name along with the Synthroid name. The name Synthroid was stamped directly on the pill (Abbvie was the ). I received my final refill via mail order (different ) a generic pill stamped USB. I prefer not to take the generic. If possible please write a 90 day refill for Synthroid 100 Mcg EDWIN (dispense as written) no generic. I will  the script at your office, in order to save you and your RN time and aggravation. Please do not call the pharmacy. I will try to find a pharmacy that stocks Synthroid on their shelves. (Will not be using mail order [facility] any longer) If I need TSH blood work before doing so let me know. I realize we don't have control over the  and pharmacies but I do have control over my own health. Did research on generic drugs vs.   name brand drugs. Thank you for your time and consideration.   Magda Isabel

## 2024-04-08 NOTE — TELEPHONE ENCOUNTER
Patient requesting synthroid to be sent not generic. She would like a printed script for her to find in stock synthroid.     Last TSH 9/12/2023- 9 month supply last sent at this time as well.    Ok for script for synthroid

## 2024-04-11 NOTE — TELEPHONE ENCOUNTER
Pt wondering the status of the order. Pt requesting to  the paper script asap. Pt requesting call back when ready

## 2024-07-01 ENCOUNTER — LABORATORY ENCOUNTER (OUTPATIENT)
Dept: LAB | Age: 74
End: 2024-07-01
Attending: INTERNAL MEDICINE
Payer: MEDICARE

## 2024-07-01 DIAGNOSIS — R79.89 ABNORMAL TSH: ICD-10-CM

## 2024-07-01 DIAGNOSIS — Z00.00 ROUTINE GENERAL MEDICAL EXAMINATION AT A HEALTH CARE FACILITY: ICD-10-CM

## 2024-07-01 DIAGNOSIS — B02.9 HERPES ZOSTER WITHOUT COMPLICATION: ICD-10-CM

## 2024-07-01 LAB
ALBUMIN SERPL-MCNC: 3.5 G/DL (ref 3.4–5)
ALBUMIN/GLOB SERPL: 1.2 {RATIO} (ref 1–2)
ALP LIVER SERPL-CCNC: 60 U/L
ALT SERPL-CCNC: 28 U/L
ANION GAP SERPL CALC-SCNC: 7 MMOL/L (ref 0–18)
AST SERPL-CCNC: 24 U/L (ref 15–37)
BASOPHILS # BLD AUTO: 0.03 X10(3) UL (ref 0–0.2)
BASOPHILS NFR BLD AUTO: 0.6 %
BILIRUB SERPL-MCNC: 0.5 MG/DL (ref 0.1–2)
BUN BLD-MCNC: 14 MG/DL (ref 9–23)
CALCIUM BLD-MCNC: 8.3 MG/DL (ref 8.5–10.1)
CHLORIDE SERPL-SCNC: 107 MMOL/L (ref 98–112)
CHOLEST SERPL-MCNC: 196 MG/DL (ref ?–200)
CO2 SERPL-SCNC: 29 MMOL/L (ref 21–32)
CREAT BLD-MCNC: 1.23 MG/DL
EGFRCR SERPLBLD CKD-EPI 2021: 46 ML/MIN/1.73M2 (ref 60–?)
EOSINOPHIL # BLD AUTO: 0.12 X10(3) UL (ref 0–0.7)
EOSINOPHIL NFR BLD AUTO: 2.4 %
ERYTHROCYTE [DISTWIDTH] IN BLOOD BY AUTOMATED COUNT: 12.2 %
FASTING PATIENT LIPID ANSWER: YES
FASTING STATUS PATIENT QL REPORTED: YES
GLOBULIN PLAS-MCNC: 3 G/DL (ref 2.8–4.4)
GLUCOSE BLD-MCNC: 78 MG/DL (ref 70–99)
HCT VFR BLD AUTO: 44.9 %
HDLC SERPL-MCNC: 68 MG/DL (ref 40–59)
HGB BLD-MCNC: 14.9 G/DL
IMM GRANULOCYTES # BLD AUTO: 0.02 X10(3) UL (ref 0–1)
IMM GRANULOCYTES NFR BLD: 0.4 %
LDLC SERPL CALC-MCNC: 119 MG/DL (ref ?–100)
LYMPHOCYTES # BLD AUTO: 0.86 X10(3) UL (ref 1–4)
LYMPHOCYTES NFR BLD AUTO: 17 %
MCH RBC QN AUTO: 30.5 PG (ref 26–34)
MCHC RBC AUTO-ENTMCNC: 33.2 G/DL (ref 31–37)
MCV RBC AUTO: 92 FL
MONOCYTES # BLD AUTO: 0.55 X10(3) UL (ref 0.1–1)
MONOCYTES NFR BLD AUTO: 10.9 %
NEUTROPHILS # BLD AUTO: 3.47 X10 (3) UL (ref 1.5–7.7)
NEUTROPHILS # BLD AUTO: 3.47 X10(3) UL (ref 1.5–7.7)
NEUTROPHILS NFR BLD AUTO: 68.7 %
NONHDLC SERPL-MCNC: 128 MG/DL (ref ?–130)
OSMOLALITY SERPL CALC.SUM OF ELEC: 295 MOSM/KG (ref 275–295)
PLATELET # BLD AUTO: 198 10(3)UL (ref 150–450)
POTASSIUM SERPL-SCNC: 4.1 MMOL/L (ref 3.5–5.1)
PROT SERPL-MCNC: 6.5 G/DL (ref 6.4–8.2)
RBC # BLD AUTO: 4.88 X10(6)UL
SODIUM SERPL-SCNC: 143 MMOL/L (ref 136–145)
TRIGL SERPL-MCNC: 50 MG/DL (ref 30–149)
TSI SER-ACNC: 1.7 MIU/ML (ref 0.36–3.74)
VLDLC SERPL CALC-MCNC: 9 MG/DL (ref 0–30)
WBC # BLD AUTO: 5.1 X10(3) UL (ref 4–11)

## 2024-07-01 PROCEDURE — 80053 COMPREHEN METABOLIC PANEL: CPT

## 2024-07-01 PROCEDURE — 84443 ASSAY THYROID STIM HORMONE: CPT

## 2024-07-01 PROCEDURE — 85025 COMPLETE CBC W/AUTO DIFF WBC: CPT

## 2024-07-01 PROCEDURE — 36415 COLL VENOUS BLD VENIPUNCTURE: CPT

## 2024-07-01 PROCEDURE — 80061 LIPID PANEL: CPT

## 2024-07-09 ENCOUNTER — OFFICE VISIT (OUTPATIENT)
Dept: INTERNAL MEDICINE CLINIC | Facility: CLINIC | Age: 74
End: 2024-07-09
Payer: MEDICARE

## 2024-07-09 VITALS
BODY MASS INDEX: 29.72 KG/M2 | DIASTOLIC BLOOD PRESSURE: 68 MMHG | WEIGHT: 165.63 LBS | SYSTOLIC BLOOD PRESSURE: 120 MMHG | OXYGEN SATURATION: 98 % | RESPIRATION RATE: 18 BRPM | TEMPERATURE: 97 F | HEART RATE: 60 BPM | HEIGHT: 62.6 IN

## 2024-07-09 DIAGNOSIS — N18.31 STAGE 3A CHRONIC KIDNEY DISEASE (HCC): ICD-10-CM

## 2024-07-09 DIAGNOSIS — Z85.828 HISTORY OF BASAL CELL CARCINOMA OF SKIN: ICD-10-CM

## 2024-07-09 DIAGNOSIS — M12.9 ARTHRITIS INVOLVING MULTIPLE SITES: ICD-10-CM

## 2024-07-09 DIAGNOSIS — E89.0 S/P TOTAL THYROIDECTOMY: ICD-10-CM

## 2024-07-09 DIAGNOSIS — E78.9 BORDERLINE HIGH CHOLESTEROL: ICD-10-CM

## 2024-07-09 DIAGNOSIS — M54.50 LOW BACK PAIN WITH RADIATION: ICD-10-CM

## 2024-07-09 DIAGNOSIS — E03.8 OTHER SPECIFIED HYPOTHYROIDISM: ICD-10-CM

## 2024-07-09 DIAGNOSIS — Z78.0 POST-MENOPAUSAL: ICD-10-CM

## 2024-07-09 DIAGNOSIS — Z00.00 ENCOUNTER FOR MEDICARE ANNUAL WELLNESS EXAM: Primary | ICD-10-CM

## 2024-07-09 DIAGNOSIS — Z12.31 ENCOUNTER FOR SCREENING MAMMOGRAM FOR MALIGNANT NEOPLASM OF BREAST: ICD-10-CM

## 2024-07-09 PROBLEM — R53.81 MALAISE AND FATIGUE: Status: RESOLVED | Noted: 2023-03-27 | Resolved: 2024-07-09

## 2024-07-09 PROBLEM — R39.89 SENSATION OF PRESSURE IN BLADDER AREA: Status: RESOLVED | Noted: 2023-06-16 | Resolved: 2024-07-09

## 2024-07-09 PROBLEM — G89.29 CHRONIC LEFT SHOULDER PAIN: Status: RESOLVED | Noted: 2023-03-27 | Resolved: 2024-07-09

## 2024-07-09 PROBLEM — B02.9 HERPES ZOSTER WITHOUT COMPLICATION: Status: RESOLVED | Noted: 2021-08-27 | Resolved: 2024-07-09

## 2024-07-09 PROBLEM — M25.512 CHRONIC LEFT SHOULDER PAIN: Status: RESOLVED | Noted: 2023-03-27 | Resolved: 2024-07-09

## 2024-07-09 PROBLEM — R53.83 MALAISE AND FATIGUE: Status: RESOLVED | Noted: 2023-03-27 | Resolved: 2024-07-09

## 2024-07-09 PROCEDURE — 3008F BODY MASS INDEX DOCD: CPT | Performed by: INTERNAL MEDICINE

## 2024-07-09 PROCEDURE — 96160 PT-FOCUSED HLTH RISK ASSMT: CPT | Performed by: INTERNAL MEDICINE

## 2024-07-09 PROCEDURE — 3078F DIAST BP <80 MM HG: CPT | Performed by: INTERNAL MEDICINE

## 2024-07-09 PROCEDURE — G0439 PPPS, SUBSEQ VISIT: HCPCS | Performed by: INTERNAL MEDICINE

## 2024-07-09 PROCEDURE — 1159F MED LIST DOCD IN RCRD: CPT | Performed by: INTERNAL MEDICINE

## 2024-07-09 PROCEDURE — 1125F AMNT PAIN NOTED PAIN PRSNT: CPT | Performed by: INTERNAL MEDICINE

## 2024-07-09 PROCEDURE — 3074F SYST BP LT 130 MM HG: CPT | Performed by: INTERNAL MEDICINE

## 2024-07-09 PROCEDURE — 1170F FXNL STATUS ASSESSED: CPT | Performed by: INTERNAL MEDICINE

## 2024-07-09 PROCEDURE — 1160F RVW MEDS BY RX/DR IN RCRD: CPT | Performed by: INTERNAL MEDICINE

## 2024-07-09 PROCEDURE — 99214 OFFICE O/P EST MOD 30 MIN: CPT | Performed by: INTERNAL MEDICINE

## 2024-07-09 RX ORDER — LEVOTHYROXINE SODIUM 100 MCG
100 TABLET ORAL
Qty: 90 TABLET | Refills: 3 | Status: SHIPPED | OUTPATIENT
Start: 2024-07-09

## 2024-07-09 NOTE — PROGRESS NOTES
Subjective:   Magda Isabel is a 73 year old female who presents for a MA AHA (Medicare Advantage Annual Health Assessment) and Medicare Subsequent Annual Wellness visit (Pt already had Initial Annual Wellness) and scheduled follow up of multiple significant but stable problems.     1. Encounter for Medicare annual wellness exam (Primary)- she is up to date on mammogram and colonoscopy for this year, referred for dexa. She declined covid 19 booster,  May consider shingrix from pharmacy.  2. S/P total thyroidectomy- doing well on Synthroid 100mcg daily, stable weight and energy  3. Other specified hypothyroidism- as above, TSH normal, refilled Synthroid (patient wants Synthroid over levothyroxine- she feels name brand works better for her)  -     Synthroid; Take 1 tablet (100 mcg total) by mouth before breakfast.  Dispense: 90 tablet; Refill: 3  4. Stage 3a chronic kidney disease (HCC)- stable, no  complaints, will try to drink more water  5. Low back pain with radiation- improved with stretching exercises  6. Borderline high cholesterol- heart healthy diet, monitor yearly  7. History of basal cell carcinoma of skin- she follows with derm yearly  8. Post-menopausal- check dexa, discussed adequate ca and vit d intake  -     XR DEXA BONE DENSITOMETRY (CPT=77080); Future; Expected date: 07/09/2024  9. Arthritis involving multiple sites- doing well with stretching exercises daily, knees and shoulder doing ok.   10. Encounter for screening mammogram for malignant neoplasm of breast  -     [facility] NICHELLE 2D+3D SCREENING BILAT (CPT=77067/05864); Future; Expected date: 03/16/2025    History/Other:   Fall Risk Assessment:   She has been screened for Falls and is low risk.      Cognitive Assessment:   She had a completely normal cognitive assessment - see flowsheet entries     Functional Ability/Status:   Magda Isabel has a completely normal functional assessment. See flowsheet for details.      Depression Screening  (PHQ):  PHQ-2 SCORE: 0  , done 7/9/2024   If you checked off any problems, how difficult have these problems made it for you to do your work, take care of things at home, or get along with other people?: Not difficult at all    Last [place] Suicide Screening on 7/9/2024 was No Risk.         Advanced Directives:   She does NOT have a Living Will. [Do you have a living will?: No]  She does NOT have a Power of  for Health Care. [Do you have a healthcare power of ?: No]  Not discussed      Patient Active Problem List   Diagnosis    Other specified hypothyroidism    Borderline high cholesterol    History of basal cell carcinoma of skin    S/P total thyroidectomy    Low back pain with radiation    Numbness and tingling in left hand    Family history of diabetes mellitus    Arthritis involving multiple sites    Stage 3a chronic kidney disease (HCC)     Allergies:  She is allergic to penicillins.    Current Medications:  Outpatient Medications Marked as Taking for the 7/9/24 encounter (Office Visit) with Suzanne Mancia MD   Medication Sig    SYNTHROID 100 MCG Oral Tab Take 1 tablet (100 mcg total) by mouth before breakfast.    Multiple Vitamins-Minerals (MULTI-VITAMIN/MINERALS) Oral Tab Take 1 tablet by mouth daily.    CALCIUM OR Take by mouth.       Medical History:  She  has a past medical history of Basal cell carcinoma and Hypothyroidism.  Surgical History:  She  has a past surgical history that includes thyroidectomy and thyroidectomy.   Family History:  Her family history includes Other in her father and mother; Stroke in her mother.  Social History:  She  reports that she has never smoked. She has never used smokeless tobacco. She reports that she does not currently use alcohol. She reports that she does not use drugs.    Tobacco:  She has never smoked tobacco.    CAGE Alcohol Screen:   CAGE screening score of 0 on 7/2/2024, showing low risk of alcohol abuse.      Patient Care Team:  Suzanne Mancia  MD as PCP - General (Internal Medicine)  Naeem Nieto, PT as Physical Therapist  Constance Hernandez, PT as Physical Therapist  Mychart, Generic Provider    Review of Systems     Negative for f/c/CP    Objective:   Physical Exam  General Appearance:  Alert, cooperative, no distress, appears stated age   Head:  Normocephalic, without obvious abnormality, atraumatic   Eyes:  PERRL, conjunctiva/corneas clear, EOM's intact both eyes   Ears:  Normal TM's and external ear canals, both ears   Nose: Nares normal, septum midline,mucosa normal, no drainage or sinus tenderness   Throat: Lips, mucosa, and tongue normal; teeth and gums normal   Neck: Supple, symmetrical, trachea midline, no LAD   Back:   Symmetric, no curvature, ROM normal, no CVA tenderness   Lungs:   Clear to auscultation bilaterally, respirations unlabored   Heart:  Regular rate and rhythm, S1 and S2 normal   Abdomen:   Soft, non-tender, bowel sounds active all four quadrants,  no masses, no organomegaly   Pelvic: Deferred   Extremities: Extremities normal, atraumatic, no cyanosis or edema   Pulses: 2+ and symmetric   Skin: Skin color, texture, turgor normal, no rashes or lesions   Lymph nodes: Cervical, supraclavicular, and axillary nodes normal   Neurologic: Normal       /68   Pulse 60   Temp 96.6 °F (35.9 °C) (Temporal)   Resp 18   Ht 5' 2.6\" (1.59 m)   Wt 165 lb 9.6 oz (75.1 kg)   LMP  (LMP Unknown)   SpO2 98%   BMI 29.71 kg/m²  Estimated body mass index is 29.71 kg/m² as calculated from the following:    Height as of this encounter: 5' 2.6\" (1.59 m).    Weight as of this encounter: 165 lb 9.6 oz (75.1 kg).    Medicare Hearing Assessment:   Hearing Screening    Time taken: 7/9/2024  9:24 AM  Entry User: Kristy Mason CMA  Screening Method: Finger Rub         Visual Acuity:   Right Eye Visual Acuity: Uncorrected Right Eye Chart Acuity: 20/50   Left Eye Visual Acuity: Uncorrected Left Eye Chart Acuity: 20/40   Both Eyes Visual Acuity:  Uncorrected Both Eyes Chart Acuity: 20/30   Able To Tolerate Visual Acuity: Yes        Assessment & Plan:   Magda Isabel is a 73 year old female who presents for a Medicare Assessment.     1. Encounter for Medicare annual wellness exam (Primary)- she is up to date on mammogram and colonoscopy for this year, referred for dexa. She declined covid 19 booster,  May consider shingrix from pharmacy.  2. S/P total thyroidectomy- doing well on Synthroid 100mcg daily, CPM  -     Synthroid; Take 1 tablet (100 mcg total) by mouth before breakfast.  Dispense: 90 tablet; Refill: 3  3. Other specified hypothyroidism- as above, TSH normal, refilled Synthroid (patient wants Synthroid over levothyroxine- she feels name brand works better for her)  -     Synthroid; Take 1 tablet (100 mcg total) by mouth before breakfast.  Dispense: 90 tablet; Refill: 3  4. Stage 3a chronic kidney disease (HCC)- stable, push water  5. Low back pain with radiation- improved with stretching exercises, can use otc tylenol prn  6. Borderline high cholesterol- heart healthy diet, monitor yearly  7. History of basal cell carcinoma of skin- she follows with derm yearly  8. Post-menopausal- check dexa, discussed adequate ca and vit d intake  -     XR DEXA BONE DENSITOMETRY (CPT=77080); Future; Expected date: 07/09/2024  9. Arthritis involving multiple sites- doing well with stretching exercises daily  10. Encounter for screening mammogram for malignant neoplasm of breast  -     [facility] NICHELLE 2D+3D SCREENING BILAT (CPT=77067/50565); Future; Expected date: 03/16/2025    The patient indicates understanding of these issues and agrees to the plan.  Continue with current treatment plan.  Reinforced healthy diet, lifestyle, and exercise.      Return in about 1 year (around 7/9/2025), or if symptoms worsen or fail to improve, for wellness.     Suzanne Mancia MD, 7/9/2024     Supplementary Documentation:   General Health:  In the past six months, have you lost more  than 10 pounds without trying?: 2 - No  Has your appetite been poor?: No  Type of Diet: Balanced;Vegetarian  How does the patient maintain a good energy level?: Daily Walks;Stretching  How would you describe your daily physical activity?: Moderate  How would you describe your current health state?: Good  How do you maintain positive mental well-being?: Social Interaction;Puzzles;Visiting Friends;Visiting Family  On a scale of 0 to 10, with 0 being no pain and 10 being severe pain, what is your pain level?: 4 - (Moderate)  In the past six months, have you experienced urine leakage?: 0-No  At any time do you feel concerned for the safety/well-being of yourself and/or your children, in your home or elsewhere?: No  Have you had any immunizations at another office such as Influenza, Hepatitis B, Tetanus, or Pneumococcal?: No    Health Maintenance   Topic Date Due    Zoster Vaccines (1 of 2) Never done    COVID-19 Vaccine (6 - 2023-24 season) 09/01/2023    MA Annual Health Assessment  01/01/2024    Annual Depression Screening  01/01/2024    Influenza Vaccine (1) 10/01/2024    Mammogram  03/15/2025    Colorectal Cancer Screening  09/02/2025    DEXA Scan  Completed    Fall Risk Screening (Annual)  Completed    Pneumococcal Vaccine: 65+ Years  Completed

## 2024-07-30 ENCOUNTER — TELEPHONE (OUTPATIENT)
Dept: INTERNAL MEDICINE CLINIC | Facility: CLINIC | Age: 74
End: 2024-07-30

## 2024-07-30 ENCOUNTER — HOSPITAL ENCOUNTER (EMERGENCY)
Facility: HOSPITAL | Age: 74
Discharge: HOME | End: 2024-07-30
Attending: EMERGENCY MEDICINE
Payer: MEDICARE

## 2024-07-30 VITALS
SYSTOLIC BLOOD PRESSURE: 131 MMHG | WEIGHT: 165 LBS | TEMPERATURE: 98 F | BODY MASS INDEX: 29.23 KG/M2 | OXYGEN SATURATION: 98 % | RESPIRATION RATE: 16 BRPM | DIASTOLIC BLOOD PRESSURE: 68 MMHG | HEART RATE: 51 BPM | HEIGHT: 63 IN

## 2024-07-30 DIAGNOSIS — K62.5 RECTAL BLEEDING: Primary | ICD-10-CM

## 2024-07-30 DIAGNOSIS — K64.9 HEMORRHOIDS, UNSPECIFIED HEMORRHOID TYPE: ICD-10-CM

## 2024-07-30 LAB
ALBUMIN SERPL-MCNC: 4.1 G/DL (ref 3.2–4.8)
ALBUMIN/GLOB SERPL: 1.5 {RATIO} (ref 1–2)
ALP LIVER SERPL-CCNC: 71 U/L
ALT SERPL-CCNC: 15 U/L
ANION GAP SERPL CALC-SCNC: 4 MMOL/L (ref 0–18)
AST SERPL-CCNC: 24 U/L (ref ?–34)
BASOPHILS # BLD AUTO: 0.04 X10(3) UL (ref 0–0.2)
BASOPHILS NFR BLD AUTO: 0.7 %
BILIRUB SERPL-MCNC: 0.7 MG/DL (ref 0.2–1.1)
BUN BLD-MCNC: 12 MG/DL (ref 9–23)
CALCIUM BLD-MCNC: 9.5 MG/DL (ref 8.7–10.4)
CHLORIDE SERPL-SCNC: 104 MMOL/L (ref 98–112)
CO2 SERPL-SCNC: 29 MMOL/L (ref 21–32)
CREAT BLD-MCNC: 1.02 MG/DL
EGFRCR SERPLBLD CKD-EPI 2021: 58 ML/MIN/1.73M2 (ref 60–?)
EOSINOPHIL # BLD AUTO: 0.08 X10(3) UL (ref 0–0.7)
EOSINOPHIL NFR BLD AUTO: 1.3 %
ERYTHROCYTE [DISTWIDTH] IN BLOOD BY AUTOMATED COUNT: 11.9 %
GLOBULIN PLAS-MCNC: 2.7 G/DL (ref 2–3.5)
GLUCOSE BLD-MCNC: 92 MG/DL (ref 70–99)
HCT VFR BLD AUTO: 45.8 %
HGB BLD-MCNC: 15.7 G/DL
IMM GRANULOCYTES # BLD AUTO: 0.02 X10(3) UL (ref 0–1)
IMM GRANULOCYTES NFR BLD: 0.3 %
LYMPHOCYTES # BLD AUTO: 0.74 X10(3) UL (ref 1–4)
LYMPHOCYTES NFR BLD AUTO: 12.2 %
MCH RBC QN AUTO: 30.5 PG (ref 26–34)
MCHC RBC AUTO-ENTMCNC: 34.3 G/DL (ref 31–37)
MCV RBC AUTO: 88.9 FL
MONOCYTES # BLD AUTO: 0.53 X10(3) UL (ref 0.1–1)
MONOCYTES NFR BLD AUTO: 8.7 %
NEUTROPHILS # BLD AUTO: 4.66 X10 (3) UL (ref 1.5–7.7)
NEUTROPHILS # BLD AUTO: 4.66 X10(3) UL (ref 1.5–7.7)
NEUTROPHILS NFR BLD AUTO: 76.8 %
OSMOLALITY SERPL CALC.SUM OF ELEC: 283 MOSM/KG (ref 275–295)
PLATELET # BLD AUTO: 188 10(3)UL (ref 150–450)
POTASSIUM SERPL-SCNC: 4 MMOL/L (ref 3.5–5.1)
PROT SERPL-MCNC: 6.8 G/DL (ref 5.7–8.2)
RBC # BLD AUTO: 5.15 X10(6)UL
SODIUM SERPL-SCNC: 137 MMOL/L (ref 136–145)
WBC # BLD AUTO: 6.1 X10(3) UL (ref 4–11)

## 2024-07-30 PROCEDURE — 99283 EMERGENCY DEPT VISIT LOW MDM: CPT

## 2024-07-30 PROCEDURE — 80053 COMPREHEN METABOLIC PANEL: CPT | Performed by: EMERGENCY MEDICINE

## 2024-07-30 PROCEDURE — 85025 COMPLETE CBC W/AUTO DIFF WBC: CPT | Performed by: EMERGENCY MEDICINE

## 2024-07-30 PROCEDURE — 36415 COLL VENOUS BLD VENIPUNCTURE: CPT

## 2024-07-30 NOTE — ED INITIAL ASSESSMENT (HPI)
Patient reports rectal bleeding since yesterday after having a bowel movement with lower abdominal pressure. This morning, a lot more red blood with mucus. Hx of internal hemorrhoids and polyps. Denies blood thinners.

## 2024-07-30 NOTE — DISCHARGE INSTRUCTIONS
Follow-up with your primary care doctor.  Follow-up with GI.  Return if dizzy, lightheaded, worsening symptoms or new complaints.  High-fiber diet.

## 2024-07-30 NOTE — TELEPHONE ENCOUNTER
Patient sent [employer] message:    Blood & mucus in  stool. Sent a message on Monday 7/29/24. How do I proceed? Magda Isabel [birth year]

## 2024-07-30 NOTE — ED PROVIDER NOTES
Patient Seen in: [facility] Emergency Department      History     Chief Complaint   Patient presents with    GI Bleeding     Rectal bleeding for a couple days, worse today then yesterday     Stated Complaint:     Subjective:   HPI    Patient is a very pleasant 73-year-old woman who presents with bright red blood per rectum.  Happened yesterday and again this morning.  More this morning.  Some mucus as well.  Has known internal hemorrhoids and polyps.  She is due for her 5-year colonoscopy next year.  Some lower abdominal cramping.  She says she is eating more fiber.  She had a formed brown bowel movement at home.  She brought a sample of that in.  No other specific complaints.  Patient is otherwise at her medical baseline.  History of diverticulitis as well.    Objective:   Past Medical History:    Basal cell carcinoma    Diverticulitis    Hypothyroidism              Past Surgical History:   Procedure Laterality Date    Thyroidectomy      Thyroidectomy                  Social History     Socioeconomic History    Marital status:    Tobacco Use    Smoking status: Never    Smokeless tobacco: Never   Vaping Use    Vaping status: Never Used   Substance and Sexual Activity    Alcohol use: Not Currently    Drug use: Never     Social Determinants of Health      Received from [facility], [facility]    Social Connections    Received from [facility], [facility]    Housing Stability              Review of Systems    Positive for stated Chief Complaint: GI Bleeding (Rectal bleeding for a couple days, worse today then yesterday)    Other systems are as noted in HPI.  Constitutional and vital signs reviewed.      All other systems reviewed and negative except as noted above.    Physical Exam     ED Triage Vitals [07/30/24 0804]   /66   Pulse 86   Resp 16   Temp 98.1 °F (36.7 °C)   Temp src Temporal   SpO2 98 %   O2 Device None (Room  air)       Current Vitals:   Vital Signs  BP: 131/68  Pulse: 51  Resp: 16  Temp: 98.1 °F (36.7 °C)  Temp src: Temporal  MAP (mmHg): 86    Oxygen Therapy  SpO2: 98 %  O2 Device: None (Room air)            Physical Exam    General: Well-appearing patient of stated age resting comfortably  HEENT: Normocephalic atraumatic.  Nonicteric sclera.  Moist mucous membranes  Lungs: No tachypnea  Cardiac: No tachycardia  Abdomen, soft.  Minimal tenderness in the suprapubic region.  Rectal: Performed with nurse chaperone.  Minimal stool in the vault.  Small amount of bright red blood.  Skin: No rashes, pallor  Neuro: No focal deficits.  Extremities: No cyanosis/edema    ED Course     Labs Reviewed   COMP METABOLIC PANEL (14) - Abnormal; Notable for the following components:       Result Value    eGFR-Cr 58 (*)     All other components within normal limits   CBC W/ DIFFERENTIAL - Abnormal; Notable for the following components:    Lymphocyte Absolute 0.74 (*)     All other components within normal limits   CBC WITH DIFFERENTIAL WITH PLATELET    Narrative:     The following orders were created for panel order CBC With Differential With Platelet.  Procedure                               Abnormality         Status                     ---------                               -----------         ------                     CBC W/ DIFFERENTIAL[[id number]]          Abnormal            Final result                 Please view results for these tests on the individual orders.   RAINBOW DRAW LAVENDER   RAINBOW DRAW LIGHT GREEN   RAINBOW DRAW BLUE   RAINBOW DRAW GOLD             Electrolytes noted and normal.  White count 6.1.  Hemoglobin 15.7         MDM      Patient presents emergency room with bright red blood per rectum.  Associate with formed brown bowel movements.  Suspect distal bleeding.  Differential includes hemorrhoidal, diverticular, AVM, other.  Favor hemorrhoids by description.  Will check CBC, comp.  Will reassess after blood  work.      Blood work reviewed discussed with patient, symptoms are consistent with a distal rectal bleeding, likely hemorrhoidal.  Should be self-limited.  High-fiber diet, supportive care.  Follow-up with her doctor.  She is scheduled for a colonoscopy next year.  She will follow-up with her GI specialist.  Return if worsening symptoms, dizzy or lightheaded, new complaints.                             Medical Decision Making      Disposition and Plan     Clinical Impression:  1. Rectal bleeding    2. Hemorrhoids, unspecified hemorrhoid type         Disposition:  Discharge  7/30/2024 10:10 am    Follow-up:  Chirag Thurston MD  [address] [facility] [address]  [phone]    Follow up in 1 week(s)            Medications Prescribed:  Discharge Medication List as of 7/30/2024 10:55 AM

## 2024-07-30 NOTE — TELEPHONE ENCOUNTER
FYI-Patient is at the ER this morning and she wanted to let  know that she was having some rectal bleeding the last few days and this morning it was a cause for concern so she went to the ER. She will call us when she can for a hospital follow up appointment.

## 2024-08-12 ENCOUNTER — OFFICE VISIT (OUTPATIENT)
Dept: INTERNAL MEDICINE CLINIC | Facility: CLINIC | Age: 74
End: 2024-08-12
Payer: MEDICARE

## 2024-08-12 VITALS
TEMPERATURE: 98 F | DIASTOLIC BLOOD PRESSURE: 72 MMHG | OXYGEN SATURATION: 99 % | HEART RATE: 73 BPM | WEIGHT: 168 LBS | BODY MASS INDEX: 30 KG/M2 | SYSTOLIC BLOOD PRESSURE: 122 MMHG

## 2024-08-12 DIAGNOSIS — K62.5 RECTAL BLEEDING: Primary | ICD-10-CM

## 2024-08-12 DIAGNOSIS — N28.9 RENAL INSUFFICIENCY: ICD-10-CM

## 2024-08-12 DIAGNOSIS — Z86.010 PERSONAL HISTORY OF COLONIC POLYPS: ICD-10-CM

## 2024-08-12 PROBLEM — Z86.0100 PERSONAL HISTORY OF COLONIC POLYPS: Status: ACTIVE | Noted: 2024-08-12

## 2024-08-12 NOTE — PROGRESS NOTES
Magda Isabel is a 73 year old female.    Chief Complaint   Patient presents with    ER F/U       HPI:     Patient here for ER follow up on rectal bleeding. She had several episodes of mild BRBPR which patient felt were related to hemorrhoids. On 7/30 she had much bigger amount of BRBPR and it was also associated with mucous. Patient denies abdominal pain/n/v/f/c. She went to ER and her CBC and CMP were stable, in fact her renal panel had improved. Bleed was felt to be hemorrhoidal but told to follow up with her GI. Patient denies more bleeding since then. She had cscope in 2020 which showed internal hemorrhoids, diverticulosis and polyps. Patient is wondering if she should see her GI now or wait until next screening colonoscopy is due (2025)      Patient Active Problem List   Diagnosis    Other specified hypothyroidism    Borderline high cholesterol    History of basal cell carcinoma of skin    S/P total thyroidectomy    Low back pain with radiation    Numbness and tingling in left hand    Family history of diabetes mellitus    Arthritis involving multiple sites    Stage 3a chronic kidney disease (HCC)     Current Outpatient Medications   Medication Sig Dispense Refill    SYNTHROID 100 MCG Oral Tab Take 1 tablet (100 mcg total) by mouth before breakfast. 90 tablet 3    Multiple Vitamins-Minerals (MULTI-VITAMIN/MINERALS) Oral Tab Take 1 tablet by mouth daily.      CALCIUM OR Take by mouth.      levothyroxine 100 MCG Oral Tab Take 1 tablet (100 mcg total) by mouth before breakfast. 90 tablet 2      Past Medical History:    Basal cell carcinoma    Diverticulitis    Hypothyroidism      Social History:  Social History     Socioeconomic History    Marital status:    Tobacco Use    Smoking status: Never    Smokeless tobacco: Never   Vaping Use    Vaping status: Never Used   Substance and Sexual Activity    Alcohol use: Not Currently    Drug use: Never     Social Determinants of Health      Received from [facility], [facility]    Social Connections    Received from [facility], [facility]    Housing Stability     Family History   Problem Relation Age of Onset    Other (Other) Father         cirrohosis    Stroke Mother     Other (Other) Mother         pneumonia        Allergies  Allergies   Allergen Reactions    Penicillins ITCHING     As child           REVIEW OF SYSTEMS:   GENERAL HEALTH:  no fevers   RESPIRATORY: no cough  CARDIOVASCULAR: denies chest pain  GI: denies abdominal pain  : no dysuria  NEURO: denies headaches  PSYCH: No reported depression   HEME: No adenopathy      EXAM:   /72   Pulse 73   Temp 97.6 °F (36.4 °C)   Wt 168 lb (76.2 kg)   LMP  (LMP Unknown)   SpO2 99%   BMI 29.76 kg/m²   GENERAL: well developed, well nourished,in no apparent distress  HEENT: atraumatic, normocephalic  NECK: supple,no adenopathy  LUNGS: normal rate without respiratory distress, lungs clear to auscultation  CARDIO: RRR nl S1 S2  GI: normal bowel sounds, soft, NT/ND  EXTREMITIES: no cyanosis, clubbing or edema  NEURO: Alert and oriented    ASSESSMENT AND PLAN:     Encounter Diagnoses   Name     Rectal bleeding- resolved, HGB WNL. History not convincing of hemorrhoidal bleed as it was a large amount of blood and associated with mucous. DDX discussed with patient including diverticular bleed and colon cancer. Highly advised to f/u with her GI now     Personal history of colonic polyps- she had polyps removed in 2020, discussed screening colonoscopy was recommended for 2025 but now she will need diagnostic colonoscopy due to rectal bleed     Renal insufficiency- improving on recent labs, continue to push water        No orders of the defined types were placed in this encounter.      Meds & Refills for this Visit:  Requested Prescriptions      No prescriptions requested or ordered in this encounter       Imaging & Consults:  GASTRO -  INTERNAL    Return if symptoms worsen or fail to improve.  There are no Patient Instructions on file for this visit.      The patient indicates understanding of these issues and agrees to the plan.

## 2024-09-22 ENCOUNTER — APPOINTMENT (OUTPATIENT)
Dept: CT IMAGING | Facility: HOSPITAL | Age: 74
End: 2024-09-22
Attending: EMERGENCY MEDICINE
Payer: MEDICARE

## 2024-09-22 ENCOUNTER — HOSPITAL ENCOUNTER (EMERGENCY)
Facility: HOSPITAL | Age: 74
Discharge: HOME | End: 2024-09-22
Attending: EMERGENCY MEDICINE
Payer: MEDICARE

## 2024-09-22 VITALS
HEART RATE: 57 BPM | SYSTOLIC BLOOD PRESSURE: 140 MMHG | DIASTOLIC BLOOD PRESSURE: 67 MMHG | OXYGEN SATURATION: 100 % | RESPIRATION RATE: 12 BRPM

## 2024-09-22 DIAGNOSIS — R42 DIZZINESS: Primary | ICD-10-CM

## 2024-09-22 LAB
ALBUMIN SERPL-MCNC: 4.4 G/DL (ref 3.2–4.8)
ALBUMIN/GLOB SERPL: 1.8 {RATIO} (ref 1–2)
ALP LIVER SERPL-CCNC: 58 U/L
ALT SERPL-CCNC: 15 U/L
ANION GAP SERPL CALC-SCNC: 5 MMOL/L (ref 0–18)
AST SERPL-CCNC: 28 U/L (ref ?–34)
BASOPHILS # BLD AUTO: 0.04 X10(3) UL (ref 0–0.2)
BASOPHILS NFR BLD AUTO: 0.7 %
BILIRUB SERPL-MCNC: 0.4 MG/DL (ref 0.2–1.1)
BUN BLD-MCNC: 11 MG/DL (ref 9–23)
CALCIUM BLD-MCNC: 9.5 MG/DL (ref 8.7–10.4)
CHLORIDE SERPL-SCNC: 106 MMOL/L (ref 98–112)
CO2 SERPL-SCNC: 28 MMOL/L (ref 21–32)
CREAT BLD-MCNC: 0.98 MG/DL
EGFRCR SERPLBLD CKD-EPI 2021: 60 ML/MIN/1.73M2 (ref 60–?)
EOSINOPHIL # BLD AUTO: 0.19 X10(3) UL (ref 0–0.7)
EOSINOPHIL NFR BLD AUTO: 3.4 %
ERYTHROCYTE [DISTWIDTH] IN BLOOD BY AUTOMATED COUNT: 11.9 %
GLOBULIN PLAS-MCNC: 2.4 G/DL (ref 2–3.5)
GLUCOSE BLD-MCNC: 101 MG/DL (ref 70–99)
GLUCOSE BLD-MCNC: 112 MG/DL (ref 70–99)
HCT VFR BLD AUTO: 45.7 %
HGB BLD-MCNC: 15.4 G/DL
IMM GRANULOCYTES # BLD AUTO: 0.01 X10(3) UL (ref 0–1)
IMM GRANULOCYTES NFR BLD: 0.2 %
LYMPHOCYTES # BLD AUTO: 1.6 X10(3) UL (ref 1–4)
LYMPHOCYTES NFR BLD AUTO: 28.4 %
MCH RBC QN AUTO: 30.3 PG (ref 26–34)
MCHC RBC AUTO-ENTMCNC: 33.7 G/DL (ref 31–37)
MCV RBC AUTO: 90 FL
MONOCYTES # BLD AUTO: 0.69 X10(3) UL (ref 0.1–1)
MONOCYTES NFR BLD AUTO: 12.3 %
NEUTROPHILS # BLD AUTO: 3.1 X10 (3) UL (ref 1.5–7.7)
NEUTROPHILS # BLD AUTO: 3.1 X10(3) UL (ref 1.5–7.7)
NEUTROPHILS NFR BLD AUTO: 55 %
OSMOLALITY SERPL CALC.SUM OF ELEC: 288 MOSM/KG (ref 275–295)
PLATELET # BLD AUTO: 180 10(3)UL (ref 150–450)
POTASSIUM SERPL-SCNC: 4.4 MMOL/L (ref 3.5–5.1)
PROT SERPL-MCNC: 6.8 G/DL (ref 5.7–8.2)
RBC # BLD AUTO: 5.08 X10(6)UL
SODIUM SERPL-SCNC: 139 MMOL/L (ref 136–145)
TROPONIN I SERPL HS-MCNC: 5 NG/L
WBC # BLD AUTO: 5.6 X10(3) UL (ref 4–11)

## 2024-09-22 PROCEDURE — 82962 GLUCOSE BLOOD TEST: CPT

## 2024-09-22 PROCEDURE — 93010 ELECTROCARDIOGRAM REPORT: CPT

## 2024-09-22 PROCEDURE — 84484 ASSAY OF TROPONIN QUANT: CPT | Performed by: EMERGENCY MEDICINE

## 2024-09-22 PROCEDURE — 99284 EMERGENCY DEPT VISIT MOD MDM: CPT

## 2024-09-22 PROCEDURE — 99285 EMERGENCY DEPT VISIT HI MDM: CPT

## 2024-09-22 PROCEDURE — 80053 COMPREHEN METABOLIC PANEL: CPT | Performed by: EMERGENCY MEDICINE

## 2024-09-22 PROCEDURE — 70450 CT HEAD/BRAIN W/O DYE: CPT | Performed by: EMERGENCY MEDICINE

## 2024-09-22 PROCEDURE — 93005 ELECTROCARDIOGRAM TRACING: CPT

## 2024-09-22 PROCEDURE — 85025 COMPLETE CBC W/AUTO DIFF WBC: CPT | Performed by: EMERGENCY MEDICINE

## 2024-09-22 PROCEDURE — 36415 COLL VENOUS BLD VENIPUNCTURE: CPT

## 2024-09-23 ENCOUNTER — OFFICE VISIT (OUTPATIENT)
Dept: INTERNAL MEDICINE CLINIC | Facility: CLINIC | Age: 74
End: 2024-09-23
Payer: MEDICARE

## 2024-09-23 ENCOUNTER — TELEPHONE (OUTPATIENT)
Dept: INTERNAL MEDICINE CLINIC | Facility: CLINIC | Age: 74
End: 2024-09-23

## 2024-09-23 VITALS
OXYGEN SATURATION: 98 % | DIASTOLIC BLOOD PRESSURE: 82 MMHG | TEMPERATURE: 97 F | BODY MASS INDEX: 29.23 KG/M2 | WEIGHT: 165 LBS | RESPIRATION RATE: 18 BRPM | HEIGHT: 63 IN | HEART RATE: 81 BPM | SYSTOLIC BLOOD PRESSURE: 136 MMHG

## 2024-09-23 DIAGNOSIS — R00.2 PALPITATIONS: Primary | ICD-10-CM

## 2024-09-23 DIAGNOSIS — R03.0 ELEVATED BLOOD PRESSURE READING: ICD-10-CM

## 2024-09-23 LAB
ATRIAL RATE: 58 BPM
P AXIS: 82 DEGREES
P-R INTERVAL: 142 MS
Q-T INTERVAL: 444 MS
QRS DURATION: 70 MS
QTC CALCULATION (BEZET): 435 MS
R AXIS: 39 DEGREES
T AXIS: 54 DEGREES
VENTRICULAR RATE: 58 BPM

## 2024-09-23 PROCEDURE — 99215 OFFICE O/P EST HI 40 MIN: CPT

## 2024-09-23 PROCEDURE — 3008F BODY MASS INDEX DOCD: CPT

## 2024-09-23 PROCEDURE — 3075F SYST BP GE 130 - 139MM HG: CPT

## 2024-09-23 PROCEDURE — 1159F MED LIST DOCD IN RCRD: CPT

## 2024-09-23 PROCEDURE — 1160F RVW MEDS BY RX/DR IN RCRD: CPT

## 2024-09-23 PROCEDURE — 3079F DIAST BP 80-89 MM HG: CPT

## 2024-09-23 RX ORDER — AMLODIPINE BESYLATE 2.5 MG/1
2.5 TABLET ORAL DAILY
Qty: 90 TABLET | Refills: 0 | Status: SHIPPED | OUTPATIENT
Start: 2024-09-23 | End: 2025-04-01

## 2024-09-23 NOTE — ED INITIAL ASSESSMENT (HPI)
Pt with c/o lightheadedness since 1330. Denies CMS, visual changes. Denies chest pain or SHARON. Symptoms began after pt walking outside.

## 2024-09-23 NOTE — ED QUICK NOTES
Pt refusing MRI due to claustrophobia, states she does not want to receive sedation. Pt agreeable to CT scan. Pt advised per RN that MRI is optimal scan for diagnostic purposes, and that is why MD ordered it. Pt verbalizes understanding but continuing to refuse MRI. Dr. Moscoso notified.

## 2024-09-23 NOTE — PROGRESS NOTES
Magda Isabel is a 74 year old female.   Chief Complaint   Patient presents with    ER F/U     EJ RM 16- Pt is here for ER F/u for lightheadness and blood pressure elevated slightly     HPI:    Patient here today for ER follow up from encounter 9/22/24 related to feeling lightheaded. Reports her blood pressure taken when symptoms started was in the 150's unknown diastolic. Due to symptoms and elevated BP she went to ER for evaluation. Patient states she has been monitoring her blood pressures and averaging around 130//90. Patient walks daily, no changes with stress, dietary habits are the same with no recent changes. PMH of CKD.    Allergies:  Allergies   Allergen Reactions    Penicillins ITCHING     As child        Current Meds:  Current Outpatient Medications   Medication Sig Dispense Refill    SYNTHROID 100 MCG Oral Tab Take 1 tablet (100 mcg total) by mouth before breakfast. 90 tablet 3    Multiple Vitamins-Minerals (MULTI-VITAMIN/MINERALS) Oral Tab Take 1 tablet by mouth daily.      CALCIUM OR Take by mouth.      levothyroxine 100 MCG Oral Tab Take 1 tablet (100 mcg total) by mouth before breakfast. 90 tablet 2        PMH:     Past Medical History:    Basal cell carcinoma    Diverticulitis    Hypothyroidism       ROS:   Review of Systems   Constitutional: Negative.    HENT: Negative.     Respiratory: Negative.     Cardiovascular:  Positive for palpitations. Negative for chest pain and leg swelling.   Gastrointestinal: Negative.    Musculoskeletal: Negative.    Neurological: Negative.    Hematological: Negative.       PHYSICAL EXAM:    LMP  (LMP Unknown)   Physical Exam  Constitutional:       Appearance: Normal appearance.   HENT:      Head: Normocephalic and atraumatic.   Cardiovascular:      Rate and Rhythm: Normal rate.      Pulses: Normal pulses.      Heart sounds: Normal heart sounds.   Pulmonary:      Effort: Pulmonary effort is normal.      Breath sounds: Normal breath sounds.   Lymphadenopathy:       Cervical: No cervical adenopathy.   Skin:     General: Skin is warm and dry.      Capillary Refill: Capillary refill takes less than 2 seconds.   Neurological:      General: No focal deficit present.      Mental Status: She is alert.   Psychiatric:         Mood and Affect: Mood normal.        ASSESSMENT/ PLAN:   1. Palpitations  Discussed holter and echo. Monitor BP and send readings in 1 week.   - CARD MONITOR HOLTER 48 HOUR (CPT=93225); Future  - CARD ECHO 2D DOPPLER (CPT=93306); Future    2. Elevated blood pressure reading  Patient with recent elevation to blood pressure in last week, symptomatic with dizziness, palpitations and pressure to back of neck when readings elevated. Recommend monitoring blood pressure and if persistently above 140/90 recommend starting amlodipine 2.5 mg and following up in 4 weeks.   - CARD MONITOR HOLTER 48 HOUR (CPT=93225); Future  - amLODIPine 2.5 MG Oral Tab; Take 1 tablet (2.5 mg total) by mouth daily.  Dispense: 90 tablet; Refill: 0  - CARD ECHO 2D DOPPLER (CPT=93306); Future      Health Maintenance Due   Topic Date Due    Zoster Vaccines (1 of 2) Never done    COVID-19 Vaccine (6 - 2023-24 season) 09/01/2024     Code selection for this visit was based on time spent (> 40 min) on date of service in preparing to see the patient, obtaining and/or reviewing separately obtained history, performing a medically appropriate examination, counseling and educating the patient/family/caregiver, ordering medications or testing, referring and communicating with other healthcare providers, documenting clinical information in the EHR.    Pt indicates understanding and agrees to the plan.   Follow up in 4 weeks     NOA Cruz

## 2024-09-23 NOTE — TELEPHONE ENCOUNTER
Received call from pt. Per pt, yesterday she was driving home and started to feel lightheaded. She took BP at home and systolic was in 150's (baseline in 120's, did not provide diastolic readings). Lightheadedness continued, but pt stated she was still able to walk normally, denied HA. Patient stated her mother  of a stroke, so she went to the ER as she was worried about having stroke. Patient unable to do MRI in ER due to claustrophobia, they did do CT. They advised pt to follow-up with PCP within a couple of days. BP this morning 132/74 HR 55. No appts with Dr. Mancia this week, offered appointment with Tanya Morales today. Patient agreeable, advised to bring BP cuff with to appointment. Patient stated understanding and agreed to plan.

## 2024-09-23 NOTE — ED PROVIDER NOTES
Patient Seen in: [facility] Emergency Department      History     Chief Complaint   Patient presents with    Dizziness     Stated Complaint: dizziness    Subjective:   HPI    Patient is a 74-year-old female comes in with lightheadedness.  Started at 1:30 PM while she was doing crossword puzzle.  No visual changes no focal motor or sensory gait vision speech problems.  It is worse when she gets up.  Not definitively vertiginous but still consistently there.  No syncope or near syncope.  Blood pressure was in the 150s at home which she was highly unusual for her so she came to the ER for evaluation.  No chest pain shortness of breath no vomiting diarrhea.    No other associated symptoms or complaints.    Objective:   No pertinent past medical history.            No pertinent past surgical history.              No pertinent social history.            Review of Systems    Positive for stated Chief Complaint: Dizziness    Other systems are as noted in HPI.  Constitutional and vital signs reviewed.      All other systems reviewed and negative except as noted above.    Physical Exam     ED Triage Vitals [09/22/24 1941]   /81   Pulse 68   Resp 15   Temp    Temp src    SpO2 100 %   O2 Device None (Room air)       Current Vitals:   Vital Signs  BP: 142/73  Pulse: 57  Resp: 12  MAP (mmHg): 93    Oxygen Therapy  SpO2: 100 %  O2 Device: None (Room air)            Physical Exam  Vitals and nursing note reviewed.   Constitutional:       General: She is not in acute distress.     Appearance: She is well-developed. She is not toxic-appearing.   HENT:      Head: Normocephalic and atraumatic.   Eyes:      General: No scleral icterus.     Conjunctiva/sclera: Conjunctivae normal.   Cardiovascular:      Rate and Rhythm: Normal rate.   Pulmonary:      Effort: Pulmonary effort is normal. No respiratory distress.   Abdominal:      General: There is no distension.   Musculoskeletal:         General: No tenderness. Normal range  of motion.      Cervical back: Normal range of motion and neck supple.   Skin:     General: Skin is warm and dry.      Findings: No rash.   Neurological:      Mental Status: She is alert and oriented to person, place, and time.      GCS: GCS eye subscore is 4. GCS verbal subscore is 5. GCS motor subscore is 6.      Cranial Nerves: No cranial nerve deficit.      Motor: No abnormal muscle tone.      Coordination: Coordination normal.   Psychiatric:         Behavior: Behavior normal.              ED Course     Labs Reviewed   COMP METABOLIC PANEL (14) - Abnormal; Notable for the following components:       Result Value    Glucose 112 (*)     All other components within normal limits   POCT GLUCOSE - Abnormal; Notable for the following components:    POC Glucose 101 (*)     All other components within normal limits   TROPONIN I HIGH SENSITIVITY - Normal   CBC WITH DIFFERENTIAL WITH PLATELET   RAINBOW DRAW LAVENDER   RAINBOW DRAW LIGHT GREEN   RAINBOW DRAW BLUE     EKG    Rate, intervals and axes as noted on EKG Report.  Rate: 58  Rhythm: Sinus Rhythm  Reading: Sinus bradycardia.  No ST-T wave changes                           MDM        -Tracing on cardiac monitor and pulse oximetry was reviewed by myself.   -The cardiac monitor revealed normal sinus rhythm as interpreted by me. The cardiac monitor was ordered to monitor the patient for dysrhythmia  -Pulse oximetry was interpreted by me and was normal.  Pulse oximeter was ordered to monitor patient for hypoxia.               -Comorbidities did add complexity to the management are mentioned in the HPI above        -I personally reviewed the prior external notes and the medical record to obtain additional history reviewed ED visit from July 2024 patient presented with rectal bleeding in the ED.  Likely distal        -DDX: Includes but not limited to -dehydration, electrolyte disturbance, near syncope, vertigo         Patient  understands that it is  She is okay with CT  but refused MRI.  May miss significant number of strokes.  She demonstrates understanding of this still declining MRI.      -I personally reviewed the CT findings and it shows- no bleed  Please refer to radiology report for official interpretation        Labs Reviewed   COMP METABOLIC PANEL (14) - Abnormal; Notable for the following components:       Result Value    Glucose 112 (*)     All other components within normal limits   POCT GLUCOSE - Abnormal; Notable for the following components:    POC Glucose 101 (*)     All other components within normal limits   TROPONIN I HIGH SENSITIVITY - Normal   CBC WITH DIFFERENTIAL WITH PLATELET   RAINBOW DRAW LAVENDER   RAINBOW DRAW LIGHT GREEN   RAINBOW DRAW BLUE     Labs reviewed CBC CMP are normal EKG troponin negative for any pattern of cardiac injury.  Orthostatic vitals are negative.  Vitals are stable throughout ED course.  She was examined at 10:20 PM.  She says she feels better.  Work appears unremarkable.  She will follow-up with her PCP as an outpatient.  Patient discharged home in stable condition                               Medical Decision Making      Disposition and Plan     Clinical Impression:  1. Dizziness         Disposition:  Discharge  9/22/2024 10:23 pm    Follow-up:  Suzanne Mancia MD  [address] [place] [id number]  [phone]    Follow up in 2 day(s)            Medications Prescribed:  Current Discharge Medication List

## 2024-09-27 ENCOUNTER — HOSPITAL ENCOUNTER (OUTPATIENT)
Dept: CV DIAGNOSTICS | Facility: HOSPITAL | Age: 74
Discharge: HOME | End: 2024-09-27
Payer: MEDICARE

## 2024-09-27 DIAGNOSIS — R00.2 PALPITATIONS: ICD-10-CM

## 2024-09-27 DIAGNOSIS — R03.0 ELEVATED BLOOD PRESSURE READING: ICD-10-CM

## 2024-09-27 PROCEDURE — 93306 TTE W/DOPPLER COMPLETE: CPT

## 2024-10-01 ENCOUNTER — TELEPHONE (OUTPATIENT)
Dept: INTERNAL MEDICINE CLINIC | Facility: CLINIC | Age: 74
End: 2024-10-01

## 2024-10-01 ENCOUNTER — MED REC SCAN ONLY (OUTPATIENT)
Dept: INTERNAL MEDICINE CLINIC | Facility: CLINIC | Age: 74
End: 2024-10-01

## 2024-10-07 ENCOUNTER — PATIENT MESSAGE (OUTPATIENT)
Dept: INTERNAL MEDICINE CLINIC | Facility: CLINIC | Age: 74
End: 2024-10-07

## 2024-10-07 NOTE — TELEPHONE ENCOUNTER
From: Magda Isabel  To: Suzanne Mancia  Sent: 10/7/2024 7:34 AM CDT  Subject: physical therapy referral    Good morning Doctor Gavino,  I've been having presumably back issues the past 1 1/2 weeks. I was hoping the stretches that I've been doing would help relieve the pain located mainly in the left buttock area. Could be sciatic nerve or piriformis muscle issues. The pain has yet to run down the back of my leg. This morning I have been experiencing slight numbness and some tingling in my left calf area. I'm requesting a referral for physical therapy for Constance DALAL. She is located at the [address] [place] [address]. Please let me know your thoughts. Thank you.  Magda Isabel  [birth year]

## 2024-10-08 ENCOUNTER — NURSE TRIAGE (OUTPATIENT)
Dept: INTERNAL MEDICINE CLINIC | Facility: CLINIC | Age: 74
End: 2024-10-08

## 2024-10-08 DIAGNOSIS — M54.50 LOW BACK PAIN WITH RADIATION: Primary | ICD-10-CM

## 2024-10-08 NOTE — TELEPHONE ENCOUNTER
Action Requested: Summary for Provider     []  Critical Lab, Recommendations Needed  [] Need Additional Advice  []   FYI    [x]   Need Orders  [] Need Medications Sent to Pharmacy  []  Other     SUMMARY: Patient reports flare up of lower back/left buttock pain. Patient with history of degenerative disc, arthritis in hips, bulging disc, has done PT in the past. Pain is worse in the morning, moderate in nature, hurts to step down. Took tylenol and aleve with some relief. Advised patient to continue with otc meds, alternate cold packs, heating pad.     Patient is requesting an order for PT rather than coming in office. OK for PT order?    Reason for call: Back Pain  Onset: 1 week       Reason for Disposition   Back pain is a chronic symptom (recurrent or ongoing AND lasting > 4 weeks)    Protocols used: Back Pain-A-[place]

## 2024-10-08 NOTE — TELEPHONE ENCOUNTER
Patient advised that PT was ordered , phone number given via [employer] for patient convenience.

## 2024-10-14 ENCOUNTER — TELEPHONE (OUTPATIENT)
Dept: PHYSICAL THERAPY | Age: 74
End: 2024-10-14

## 2024-10-14 NOTE — TELEPHONE ENCOUNTER
Patient called back to discuss options for physical therapy, unable to get an appointment until 3 weeks from now, advised to call scheduling number back and discuss if there are sooner appointments at another location or call outside PT places and if she is able to get a sooner appointment she can call back for a referral to one of those locations

## 2024-10-15 ENCOUNTER — OFFICE VISIT (OUTPATIENT)
Facility: CLINIC | Age: 74
End: 2024-10-15
Payer: MEDICARE

## 2024-10-15 ENCOUNTER — HOSPITAL ENCOUNTER (OUTPATIENT)
Dept: GENERAL RADIOLOGY | Age: 74
Discharge: HOME | End: 2024-10-15
Attending: PHYSICIAN ASSISTANT
Payer: MEDICARE

## 2024-10-15 ENCOUNTER — TELEPHONE (OUTPATIENT)
Facility: CLINIC | Age: 74
End: 2024-10-15

## 2024-10-15 DIAGNOSIS — M54.50 LOW BACK PAIN, UNSPECIFIED BACK PAIN LATERALITY, UNSPECIFIED CHRONICITY, UNSPECIFIED WHETHER SCIATICA PRESENT: Primary | ICD-10-CM

## 2024-10-15 DIAGNOSIS — M54.50 LOW BACK PAIN, UNSPECIFIED BACK PAIN LATERALITY, UNSPECIFIED CHRONICITY, UNSPECIFIED WHETHER SCIATICA PRESENT: ICD-10-CM

## 2024-10-15 DIAGNOSIS — M54.16 LUMBAR RADICULITIS: Primary | ICD-10-CM

## 2024-10-15 PROCEDURE — 1160F RVW MEDS BY RX/DR IN RCRD: CPT | Performed by: PHYSICIAN ASSISTANT

## 2024-10-15 PROCEDURE — 99213 OFFICE O/P EST LOW 20 MIN: CPT | Performed by: PHYSICIAN ASSISTANT

## 2024-10-15 PROCEDURE — 72110 X-RAY EXAM L-2 SPINE 4/>VWS: CPT | Performed by: PHYSICIAN ASSISTANT

## 2024-10-15 PROCEDURE — 1159F MED LIST DOCD IN RCRD: CPT | Performed by: PHYSICIAN ASSISTANT

## 2024-10-15 RX ORDER — NAPROXEN 500 MG/1
500 TABLET ORAL 2 TIMES DAILY WITH MEALS
Qty: 40 TABLET | Refills: 0 | Status: SHIPPED | OUTPATIENT
Start: 2024-10-15

## 2024-10-15 NOTE — PROGRESS NOTES
[facility] Progress Note    Subjective: Patient returns to clinic for reevaluation of her low back.  She reports recently she insidiously began developing pain deep within the left buttock which has begun radiating to the left posterior thigh.  She also notices tingling radiating into the foot.  She feels instability in the left leg when weightbearing due to increased pain.  She does reports she has previously experienced sciatic pain and this feels less intense compared to that.  She denies any previous spine surgery.  She has been using Tylenol and Advil as needed for pain control with mild improvement.    Objective: Patient ambulating in clinic independently today.  She does have a limp favoring the left leg and ambulates with her low back hunched forward.  Mild discomfort to palpation along the left buttock and ischial tuberosity.  Straight leg raise on the left side increases tingling in the left foot.      IP KE KF TA GS EHL  L 5 5 5 5 5 5   R 5 5 5 5 5 5    Imaging: Imaging personally viewed, independently interpreted and radiology report read.  Radiographs of her lumbar spine obtained today demonstrates grade 1 anterolisthesis of L4 onto L5 with moderate spondylosis at the L5-S1 level consisting of intervertebral disc space narrowing and spur formation along the vertebral endplates.  There is also a mild to moderate degree of spondylosis at the L3-4 level and a severe degree of spondylosis at the L2-3 level.  Loss of lumbar lordosis is also noted.    Assessment/Plan: 74-year-old female with left lower extremity pain consistent with lumbar radiculopathy.  I explained natural history of lumbar radiculopathy with the patient in detail.  We discussed treatment options to reduce the inflammation of the nerve including oral NSAID use, for which a prescription of naproxen was sent to her pharmacy.  I also discussed possible use of oral steroids if pain intensifies.  She prefers to avoid steroids at this  juncture.  I also provided the patient with a DME prescription for a cane as she does have some instability with walking.  She does have physical therapy upcoming in early November and would benefit from a sooner initial appointment.  I will MyChart message the patient in 1 week for an update on her progress after a week of naproxen.  All questions were sought and answered satisfactorily.  She is happy with the plan will follow as advised.      Erick Anthony PA-C  [facility]    This note was dictated using Dragon software.  While it was briefly proofread prior to completion, some grammatical, spelling, and word choice errors due to dictation may still occur.

## 2024-10-22 ENCOUNTER — TELEPHONE (OUTPATIENT)
Dept: PHYSICAL THERAPY | Age: 74
End: 2024-10-22

## 2024-11-07 ENCOUNTER — APPOINTMENT (OUTPATIENT)
Dept: PHYSICAL THERAPY | Age: 74
End: 2024-11-07
Attending: INTERNAL MEDICINE
Payer: MEDICARE

## 2024-11-11 ENCOUNTER — TELEPHONE (OUTPATIENT)
Dept: PHYSICAL THERAPY | Facility: HOSPITAL | Age: 74
End: 2024-11-11

## 2024-11-12 ENCOUNTER — OFFICE VISIT (OUTPATIENT)
Dept: PHYSICAL THERAPY | Age: 74
End: 2024-11-12
Attending: INTERNAL MEDICINE
Payer: MEDICARE

## 2024-11-12 DIAGNOSIS — M54.50 LOW BACK PAIN WITH RADIATION: Primary | ICD-10-CM

## 2024-11-12 PROCEDURE — 97110 THERAPEUTIC EXERCISES: CPT | Performed by: PHYSICAL THERAPIST

## 2024-11-12 PROCEDURE — 97161 PT EVAL LOW COMPLEX 20 MIN: CPT | Performed by: PHYSICAL THERAPIST

## 2024-11-12 NOTE — PROGRESS NOTES
SPINE EVALUATION:     Diagnosis:   Low back pain with radiation (M54.50)      Referring Provider: Suzanne Mancia  Date of Evaluation:    11/12/2024    Precautions:  Drug Allergy, Cancer Next MD visit:   none scheduled  Date of Surgery: n/a     PATIENT SUMMARY   Magda Isabel is a 74 year old female who presents to therapy today with complaints of L buttock pain with pain referring down LLE stopping at calf with numbness & tingling to calf & top of foot, last night sharp pain to great toe. Previously pain was constant, unable to walk longer distances. Currently pain is not as constant. Pt reports insidious onset symptoms started end of September; started getting pain center of L buttock; pt ended up seeing Erick BRANNON who gave her naproxen & completed xrays. Symptoms are only on the L side. Denies: bowel/bladder changes, saddle anesthesia. Pt curious if could be connected to hx shingles (three mild episodes, face, forearm, L shoulder)    Pt describes pain level current 5-6/10, at best 5/10, at worst 7/10.   Current functional limitations include bending forward to don socks, walking > 30 min (previously able to walk 1 hour), sit to stand transfers    Magda describes prior level of function: hx LBP with PT including hx sciatica many years ago. Pt goals include alleviate the pain.  Past medical history was reviewed with Magda. Significant findings include  has a past medical history of Basal cell carcinoma, Diverticulitis, and Hypothyroidism.    ASSESSMENT  Magda presents to physical therapy evaluation with primary c/o L buttock pain with pain referring down LLE stopping at calf with numbness & tingling to calf & top of foot, last night sharp pain to great toe. The results of the objective tests and measures show impairments in posture, lumbar ROM, LE strength, positive slump & SLR indicating increased LLE neural tension. Functional deficits include but are not limited to bending forward to don socks, walking >  30 min, sit to stand transfers. Signs and symptoms are consistent with diagnosis of Low back pain with radiation (M54.50). Pt and PT discussed evaluation findings, pathology, POC and HEP. Pt voiced understanding and performs HEP correctly without reported pain. Skilled Physical Therapy is medically necessary to address the above impairments and reach functional goals.     OBJECTIVE:   Observation/Posture: lumbar flat back, FHP, increased thoracic kyphosis, mild ant trunk lean  Neuro Screen: symmetrical dermatomes L3-S1    Lumbar AROM: (* denotes performed with pain)  Flexion: mod restricted* referred pain  Extension: carina restricted mild LBP no referred pain  Sidebending: R mod restricted; L mod restricted  Rotation: R carina restricted; L carina restricted    Accessory motion: TBD  Palpation: no tension along B T/L PSM or PSIS    Strength: (* denotes performed with pain)  LE   Hip flexion (L2): R 4/5; L 4-/5  Hip Extension: R 2+/5; L 3/5   Knee extension (L3): R 5/5; L 4/5   DF (L4): R 5/5; L 4+/5  Great Toe Ext (L5): R 5/5, L 4+/5  PF (S1): R 5/5; L 4/5     Flexibility: TBD    Special tests:   Repeated Motions Testing: peripheralization with standing lumbar flex, increased LBP no LE symptoms standing lumbar ext & supine DKTC, prone no change, GERARDO low back tightness no LE symptoms  Slump: + LLE  SLR: R -, L + 45 deg    Gait: pt ambulates on level ground with stooped posture/forward lean.    Today’s Treatment and Response:   Pt education was provided on exam findings, treatment diagnosis, treatment plan, expectations, and prognosis. Pt was also provided recommendations for activity modifications, possible soreness after evaluation, and importance of remaining active; Centralization vs peripheralization; Cauda equina. No peripheralization with HEP exercises.    Patient was instructed in and issued a HEP for:   Access Code: [email or web address]  URL: [email or web address]  Date: 11/12/2024  Prepared by: Constance  Mary    Exercises  - Supine Double Knee to Chest  - 3-4 x daily - 7 x weekly - 2 sets - 10 reps - 2-3 sec hold  - Supine Posterior Pelvic Tilt  - 3-4 x daily - 7 x weekly - 2 sets - 10 reps - 2-3 sec hold    Charges: PT Eval Low Complexity, Therex X 1      Total Timed Treatment: 12 min     Total Treatment Time: 45 min   Based on the clinical presentation, examination and history, this evaluation/ condition is stable and low complexity.    PLAN OF CARE:    Goals: (to be met in 10 visits)   Pt will improve transversus abdominis recruitment to perform proper isometric contraction without requiring verbal or tactile cuing to promote advancement of therex   Pt will demonstrate good understanding of proper posture and body mechanics to decrease pain and improve spinal safety   Pt will improve lumbar spine AROM flexion to WNL to allow increase ease with bending forward to don socks   Pt will report improved symptom centralization and absence of radicular symptoms for 3 consecutive days to improve function with ADL   Pt will improve B hip extension strength to at least 4-/5 for walking > 60 min in the community  Pt will demonstrate improved core strength to be able to perform sit to stand transfers with at worst 3/10 pain  Pt will be independent and compliant with comprehensive HEP to maintain progress achieved in PT      Frequency / Duration: Patient will be seen for 1-2 x/week or a total of 10 visits over a 90 day period. Treatment will include: Gait training, Manual Therapy, Neuromuscular Re-education, Self-Care Home Management, Therapeutic Activities, Therapeutic Exercise, and Home Exercise Program instruction    Education or treatment limitation: None  Rehab Potential:excellent    LEFS Score  LEFS Score: (Patient-Rptd) 45 % (11/5/2024  7:43 PM)    Patient/Family/Caregiver was advised of these findings, precautions, and treatment options and has agreed to actively participate in planning and for this course of  care.    Thank you for your referral. Please co-sign or sign and return this letter via fax as soon as possible to [phone]. If you have any questions, please contact me at Dept: [phone]    Sincerely,  Electronically signed by therapist: Constance Hernandez PT    Physician's certification required: Yes  I certify the need for these services furnished under this plan of treatment and while under my care.    X___________________________________________________ Date____________________    Certification From: 11/12/2024  To:2/10/2025

## 2024-11-12 NOTE — PATIENT INSTRUCTIONS
Access Code: [email or web address]  URL: [email or web address]  Date: 11/12/2024  Prepared by: Constance Hernandez    Exercises  - Supine Double Knee to Chest  - 3-4 x daily - 7 x weekly - 2 sets - 10 reps - 2-3 sec hold  - Supine Posterior Pelvic Tilt  - 3-4 x daily - 7 x weekly - 2 sets - 10 reps - 2-3 sec hold

## 2024-11-13 ENCOUNTER — APPOINTMENT (OUTPATIENT)
Dept: PHYSICAL THERAPY | Age: 74
End: 2024-11-13
Attending: INTERNAL MEDICINE
Payer: MEDICARE

## 2024-11-13 ENCOUNTER — TELEPHONE (OUTPATIENT)
Dept: PHYSICAL THERAPY | Age: 74
End: 2024-11-13

## 2024-11-14 ENCOUNTER — APPOINTMENT (OUTPATIENT)
Dept: PHYSICAL THERAPY | Age: 74
End: 2024-11-14
Attending: INTERNAL MEDICINE
Payer: MEDICARE

## 2024-11-18 ENCOUNTER — OFFICE VISIT (OUTPATIENT)
Dept: PHYSICAL THERAPY | Age: 74
End: 2024-11-18
Attending: INTERNAL MEDICINE
Payer: MEDICARE

## 2024-11-18 PROCEDURE — 97110 THERAPEUTIC EXERCISES: CPT | Performed by: PHYSICAL THERAPIST

## 2024-11-18 NOTE — PROGRESS NOTES
Diagnosis:   Low back pain with radiation (M54.50)         Referring Provider: Suzanne Mancia  Date of Evaluation:    11/12/2024    Precautions:  Drug Allergy, Cancer Next MD visit:   none scheduled  Date of Surgery: n/a   Insurance Primary/Secondary: BLUE CROSS MCR / N/A     # Auth Visits: n/a         Subjective: Pt reports HEP compliant, helped the symptoms. But then dealt with symptom recurrence down to calf. Currently to L buttock & post thigh like a cramping. A little bit of pain to the ant L thigh. Reminds her of when she had bulging disc but not like it was. Seems to be sleeping better, better with getting out of bed. Maybe has to do with the weather. Better with warm shower. Cont with issues with bending forward even in sitting to don shoes    Pain: 5/10      Objective: See Flowsheet for details  11/18/2024  Lumbar AROM: (* denotes performed with pain)  Flexion: initial min restricted with tightness to calf; 5 reps progressively increased pain to glutes (deferred further reps 2/2 intensity of symptoms)  Extension: initial carina restricted with back tightness only; 5 reps no pain anywhere (no pain to start: deferred further reps 2/2 no obvious change)      Assessment: Challenged with obtaining proper form with hooklying PPT. Will defer for now. Improved ability to sit with LLE crossing over (to simulate donning shoe/ socks) after supine sciatic nerve flossing & piriformis stretch. Mild centralization after getting up from seated lumbar SB stretch.       Goals: (to be met in 10 visits)   Pt will improve transversus abdominis recruitment to perform proper isometric contraction without requiring verbal or tactile cuing to promote advancement of therex   Pt will demonstrate good understanding of proper posture and body mechanics to decrease pain and improve spinal safety   Pt will improve lumbar spine AROM flexion to WNL to allow increase ease with bending forward to don socks   Pt will report improved symptom  centralization and absence of radicular symptoms for 3 consecutive days to improve function with ADL   Pt will improve B hip extension strength to at least 4-/5 for walking > 60 min in the community  Pt will demonstrate improved core strength to be able to perform sit to stand transfers with at worst 3/10 pain  Pt will be independent and compliant with comprehensive HEP to maintain progress achieved in PT      Plan: Cont to promote centralization of symptoms; add in prox glute strengthening (avoid bridges)  Date: 11/18/2024  TX#: 2/10 Date:                 TX#: 3/ Date:                 TX#: 4/ Date:                 TX#: 5/ Date:   TX#: 6/   Therex: 40'  Education: peripheralization vs centralization  Hooklying PPT: form check - defer for home  Supine DKTC 6 X 30\"  Log rolling  Repeated movements  Manual L sciatic nerve flossing 2 X 15 supine - HEP instruction  Manual L crossover piriformis stretch 4 X 30\" - HEP instruction  Seated SB lumbar flex stretch 5\" X 5       HEP: Access Code: [email or web address]  URL: [email or web address]  Date: 11/12/2024  Prepared by: Constance Hernandez    Exercises  - Supine Double Knee to Chest  - 3-4 x daily - 7 x weekly - 2 sets - 10 reps - 2-3 sec hold  - Supine Posterior Pelvic Tilt  - 3-4 x daily - 7 x weekly - 2 sets - 10 reps - 2-3 sec hold      Access Code: [email or web address]  URL: [email or web address]  Date: 11/18/2024  Prepared by: Constance Mary    Exercises  - Supine Piriformis Stretch with Towel  - 1-2 x daily - 7 x weekly - 3 sets - 30 sec hold  - Supine Sciatic Nerve Glide  - 1-2 x daily - 7 x weekly - 1-2 sets - 10 reps - 1 sec hold    Charges: Therex X 3       Total Timed Treatment: 40 min  Total Treatment Time: 40 min

## 2024-11-18 NOTE — PATIENT INSTRUCTIONS
Access Code: [email or web address]  URL: [email or web address]  Date: 11/18/2024  Prepared by: Constance Hernandez    Exercises  - Supine Piriformis Stretch with Towel  - 1-2 x daily - 7 x weekly - 3 sets - 30 sec hold  - Supine Sciatic Nerve Glide  - 1-2 x daily - 7 x weekly - 1-2 sets - 10 reps - 1 sec hold

## 2024-11-22 ENCOUNTER — OFFICE VISIT (OUTPATIENT)
Dept: PHYSICAL THERAPY | Age: 74
End: 2024-11-22
Attending: INTERNAL MEDICINE
Payer: MEDICARE

## 2024-11-22 PROCEDURE — 97110 THERAPEUTIC EXERCISES: CPT | Performed by: PHYSICAL THERAPIST

## 2024-11-22 NOTE — PROGRESS NOTES
Diagnosis:   Low back pain with radiation (M54.50)         Referring Provider: Suzanne Mancia  Date of Evaluation:    11/12/2024    Precautions:  Drug Allergy, Cancer Next MD visit:   none scheduled  Date of Surgery: n/a   Insurance Primary/Secondary: BLUE CROSS MCR / N/A     # Auth Visits: n/a         Subjective: Pt reports with waking up in the morning feels it & has to sit down. The calf area is doing better but is still there. Nerve starting to settle down. Does feel it is starting to work its way up as you had mentioned. Excessive walking will flare it up slightly. When she sits down can settle it except for buttock area.    Pain: 0/10 standing, 0/10 sitting but has post upper thigh; was about a 6/10 this morning      Objective: See Flowsheet for details  11/18/2024  Lumbar AROM: (* denotes performed with pain)  Flexion: initial min restricted with tightness to calf; 5 reps progressively increased pain to glutes (deferred further reps 2/2 intensity of symptoms)  Extension: initial carina restricted with back tightness only; 5 reps no pain anywhere (no pain to start: deferred further reps 2/2 no obvious change)      Assessment: Cont with work to address neural tension with use of slump position for both glides & neural mobilization. As expected pt had more tension with the neural mob vs the glides. Will add to HEP. No peripheralization with SKTC or seated SB lumbar stretch, also added to HEP. Pt reported feeling looser with supine LTR stretch.      Goals: (to be met in 10 visits)   Pt will improve transversus abdominis recruitment to perform proper isometric contraction without requiring verbal or tactile cuing to promote advancement of therex   Pt will demonstrate good understanding of proper posture and body mechanics to decrease pain and improve spinal safety   Pt will improve lumbar spine AROM flexion to WNL to allow increase ease with bending forward to don socks   Pt will report improved symptom centralization  and absence of radicular symptoms for 3 consecutive days to improve function with ADL   Pt will improve B hip extension strength to at least 4-/5 for walking > 60 min in the community  Pt will demonstrate improved core strength to be able to perform sit to stand transfers with at worst 3/10 pain  Pt will be independent and compliant with comprehensive HEP to maintain progress achieved in PT      Plan: Cont to promote centralization of symptoms; add in prox glute strengthening (avoid bridges)  Date: 11/18/2024  TX#: 2/10 Date: 11/22/2024  TX#: 3/10 Date:                 TX#: 4/ Date:                 TX#: 5/ Date:   TX#: 6/   Therex: 40'  Education: peripheralization vs centralization  Hooklying PPT: form check - defer for home  Supine DKTC 6 X 30\"  Log rolling  Repeated movements  Manual L sciatic nerve flossing 2 X 15 supine - HEP instruction  Manual L crossover piriformis stretch 4 X 30\" - HEP instruction  Seated SB lumbar flex stretch 5\" X 5 Therex: 45'  Cont education goals for centralization with goal to avoid peripheralization re: HEP/ PT TX  Seated SB lumbar flex stretch 5\" X 10 - HEP  LTR 5\" X 10 ea  SKTC 10 X 10\" ea - HEP  Seated Slump glide LLE X 10 - HEP  Seated Slump tensioners/ neural mob LLE X 10 - HEP  NuStep L3 X 6'  Education: managing walking with symptoms: avoiding increased symptoms during or after      HEP: Access Code: [email or web address]  URL: [email or web address]  Date: 11/12/2024  Prepared by: Constance Hernandez    Exercises  - Supine Double Knee to Chest  - 3-4 x daily - 7 x weekly - 2 sets - 10 reps - 2-3 sec hold  - Supine Posterior Pelvic Tilt  - 3-4 x daily - 7 x weekly - 2 sets - 10 reps - 2-3 sec hold      Access Code: [email or web address]  URL: [email or web address]  Date: 11/18/2024  Prepared by: Constance Hernandez    Exercises  - Supine Piriformis Stretch with Towel  - 1-2 x daily - 7 x weekly - 3 sets - 30 sec hold  - Supine Sciatic Nerve Glide  - 1-2 x daily - 7 x weekly - 1-2  sets - 10 reps - 1 sec hold      Access Code: [email or web address]  URL: [email or web address]  Date: 11/22/2024  Prepared by: Constance Hernandez    Exercises  - Slump Stretch  - 1 x daily - 7 x weekly - 1-2 sets - 10 reps  - Seated Slump Nerve Glide  - 1 x daily - 7 x weekly - 1-2 sets - 10 reps  - Seated Flexion Stretch with Swiss Ball  - 1 x daily - 7 x weekly - 1-2 sets - 10 reps - 5 sec hold  - Hooklying Single Knee to Chest Stretch  - 1 x daily - 7 x weekly - 1 sets - 10 reps - 10 sec hold    Charges: Therex X 3       Total Timed Treatment: 45 min  Total Treatment Time: 45 min

## 2024-11-22 NOTE — PATIENT INSTRUCTIONS
Access Code: [email or web address]  URL: [email or web address]  Date: 11/22/2024  Prepared by: Constance Hernandez    Exercises  - Slump Stretch  - 1 x daily - 7 x weekly - 1-2 sets - 10 reps  - Seated Slump Nerve Glide  - 1 x daily - 7 x weekly - 1-2 sets - 10 reps  - Seated Flexion Stretch with Swiss Ball  - 1 x daily - 7 x weekly - 1-2 sets - 10 reps - 5 sec hold  - Hooklying Single Knee to Chest Stretch  - 1 x daily - 7 x weekly - 1 sets - 10 reps - 10 sec hold

## 2024-11-25 ENCOUNTER — OFFICE VISIT (OUTPATIENT)
Dept: PHYSICAL THERAPY | Age: 74
End: 2024-11-25
Attending: INTERNAL MEDICINE
Payer: MEDICARE

## 2024-11-25 PROCEDURE — 97110 THERAPEUTIC EXERCISES: CPT | Performed by: PHYSICAL THERAPIST

## 2024-11-25 NOTE — PROGRESS NOTES
Diagnosis:   Low back pain with radiation (M54.50)         Referring Provider: Suzanne Mancia  Date of Evaluation:    11/12/2024    Precautions:  Drug Allergy, Cancer Next MD visit:   12/6/2024  Date of Surgery: n/a   Insurance Primary/Secondary: BLUE CROSS MCR / N/A     # Auth Visits: n/a          Discharge Summary  Pt has attended 4 visits in Physical Therapy.     Subjective: Pt reports is feeling better, has some soreness, this is resolving. Symptoms are centralized. Walked about 45 min this weekend. Feels the moving is helping her. Feels she is moving much better.    Pt describes pain level current 0/10, at best 0/10, at worst 2-3/10.   Current functional limitations include bending forward to don socks (minimal deficit now), walking > 45 min (previously able to walk 1 hour), sit to stand transfers (minimal deficit now)      75-80 % improvement overall in symptoms from first onset to now      Pain: 0/10 seated at rest      Objective: See Flowsheet for details  11/25/2024  Lumbar AROM: (* denotes performed with pain)  Flexion: WNL (B pulling to thighs) - no pain    Strength: (* denotes performed with pain)  LE   Hip Extension: R 3-/5; L 3+/5       Assessment: Pt has completed 4 visits of skilled PT. Pt is self-discharging & reports ability to cont with exercises via HEP. Discussed progressions & modifications; pt verbalized understanding; pt is aware to hold off from any exercise if creating pain (pt aware the difference between muscle soreness & pain). Pt advised to contact PT if questions/ concerns arise while performing HEP. Pt advised to follow up with PCP if symptoms increase despite adherence to HEP. Pt is aware that if symptoms recur or increase, pt may be appropriate to return to skilled PT under new POC w/ updated RX if deemed medically necessary. Pt is in agreement with discharge plans. Thank you.      Goals: (to be met in 10 visits)   Pt will improve transversus abdominis recruitment to perform proper  isometric contraction without requiring verbal or tactile cuing to promote advancement of therex - d/c goal  Pt will demonstrate good understanding of proper posture and body mechanics to decrease pain and improve spinal safety - met  Pt will improve lumbar spine AROM flexion to WNL to allow increase ease with bending forward to don socks - met  Pt will report improved symptom centralization and absence of radicular symptoms for 3 consecutive days to improve function with ADL - improved   Pt will improve B hip extension strength to at least 4-/5 for walking > 60 min in the community - improved  Pt will demonstrate improved core strength to be able to perform sit to stand transfers with at worst 3/10 pain - met  Pt will be independent and compliant with comprehensive HEP to maintain progress achieved in PT - met    Plan: Discharge to HEP.  LEFS Score  LEFS Score: (Patient-Rptd) 45 % (11/5/2024  7:43 PM)    Post LEFS Score  Post LEFS Score: (Patient-Rptd) 92.5 % (11/25/2024 11:57 AM)    47.5 % improvement    Patient/Family/Caregiver was advised of these findings, precautions, and treatment options and has agreed to actively participate in planning and for this course of care.    Thank you for your referral. If you have any questions, please contact me at Dept: [phone].    Sincerely,  Electronically signed by therapist: Constance Hernandez PT     Physician's certification required:  No  Please co-sign or sign and return this letter via fax as soon as possible to [phone].   I certify the need for these services furnished under this plan of treatment and while under my care.    X___________________________________________________ Date____________________    Certification From: 11/25/2024  To:2/23/2025     Date: 11/18/2024  TX#: 2/10 Date: 11/22/2024  TX#: 3/10 Date: 11/25/2024  TX#: 4/10  Discharge   Therex: 40'  Education: peripheralization vs centralization  Hooklying PPT: form check - defer for home  Supine DKTC 6 X  30\"  Log rolling  Repeated movements  Manual L sciatic nerve flossing 2 X 15 supine - HEP instruction  Manual L crossover piriformis stretch 4 X 30\" - HEP instruction  Seated SB lumbar flex stretch 5\" X 5 Therex: 45'  Cont education goals for centralization with goal to avoid peripheralization re: HEP/ PT TX  Seated SB lumbar flex stretch 5\" X 10 - HEP  LTR 5\" X 10 ea  SKTC 10 X 10\" ea - HEP  Seated Slump glide LLE X 10 - HEP  Seated Slump tensioners/ neural mob LLE X 10 - HEP  NuStep L3 X 6'  Education: managing walking with symptoms: avoiding increased symptoms during or after Therex: 40'  Discharge planning/ discharge instructions  -Progressions with walking  HEP review with modifications (update: below ex's)  Re-assessment/ goal status completion  Hooklying hip Abd carmencita with Blue TB 5\" 3 X 10 - HEP  S/L hip Abd 2 X 10 L - HEP  Clams 3 X 10 L - HEP  \"Motion is lotion\"       HEP: Access Code: [email or web address]  URL: [email or web address]  Date: 11/12/2024  Prepared by: Constance Hernandez    Exercises  - Supine Double Knee to Chest  - 3-4 x daily - 7 x weekly - 2 sets - 10 reps - 2-3 sec hold  - Supine Posterior Pelvic Tilt  - 3-4 x daily - 7 x weekly - 2 sets - 10 reps - 2-3 sec hold      Access Code: [email or web address]  URL: [email or web address]  Date: 11/18/2024  Prepared by: Constance Foxley    Exercises  - Supine Piriformis Stretch with Towel  - 1-2 x daily - 7 x weekly - 3 sets - 30 sec hold  - Supine Sciatic Nerve Glide  - 1-2 x daily - 7 x weekly - 1-2 sets - 10 reps - 1 sec hold      Access Code: [email or web address]  URL: [email or web address]/  Date: 11/22/2024  Prepared by: Constance Mary    Exercises  - Slump Stretch  - 1 x daily - 7 x weekly - 1-2 sets - 10 reps  - Seated Slump Nerve Glide  - 1 x daily - 7 x weekly - 1-2 sets - 10 reps  - Seated Flexion Stretch with Swiss Ball  - 1 x daily - 7 x weekly - 1-2 sets - 10 reps - 5 sec hold  - Hooklying Single Knee to Chest Stretch  - 1 x daily - 7  x weekly - 1 sets - 10 reps - 10 sec hold      Access Code: [email or web address]  URL: [email or web address]  Date: 11/25/2024  Prepared by: Constance Hernandez    Exercises  - Hooklying Clamshell with Resistance  - 1 x daily - 3-5 x weekly - 3 sets - 10 reps - 5 sec hold  - Sidelying Hip Abduction  - 1 x daily - 3-5 x weekly - 2 sets - 10 reps - 1-2 sec hold  - Clamshell  - 1 x daily - 3-5 x weekly - 3 sets - 10 reps - 1-2 sec hold    Charges: Therex X 3       Total Timed Treatment: 40 min  Total Treatment Time: 40 min

## 2024-11-25 NOTE — PATIENT INSTRUCTIONS
Access Code: [email or web address]  URL: [email or web address]  Date: 11/25/2024  Prepared by: Constance Hernandez    Exercises  - Hooklying Clamshell with Resistance  - 1 x daily - 3-5 x weekly - 3 sets - 10 reps - 5 sec hold  - Sidelying Hip Abduction  - 1 x daily - 3-5 x weekly - 2 sets - 10 reps - 1-2 sec hold  - Clamshell  - 1 x daily - 3-5 x weekly - 3 sets - 10 reps - 1-2 sec hold

## 2024-11-26 ENCOUNTER — APPOINTMENT (OUTPATIENT)
Dept: PHYSICAL THERAPY | Age: 74
End: 2024-11-26
Attending: INTERNAL MEDICINE
Payer: MEDICARE

## 2024-12-02 ENCOUNTER — APPOINTMENT (OUTPATIENT)
Dept: PHYSICAL THERAPY | Age: 74
End: 2024-12-02
Attending: INTERNAL MEDICINE
Payer: MEDICARE

## 2024-12-06 ENCOUNTER — APPOINTMENT (OUTPATIENT)
Dept: PHYSICAL THERAPY | Age: 74
End: 2024-12-06
Attending: INTERNAL MEDICINE
Payer: MEDICARE

## 2024-12-07 ENCOUNTER — OFFICE VISIT (OUTPATIENT)
Dept: INTERNAL MEDICINE CLINIC | Facility: CLINIC | Age: 74
End: 2024-12-07
Payer: MEDICARE

## 2024-12-07 VITALS
HEART RATE: 68 BPM | BODY MASS INDEX: 30.01 KG/M2 | WEIGHT: 169.38 LBS | TEMPERATURE: 98 F | OXYGEN SATURATION: 97 % | HEIGHT: 62.99 IN | SYSTOLIC BLOOD PRESSURE: 128 MMHG | DIASTOLIC BLOOD PRESSURE: 70 MMHG

## 2024-12-07 DIAGNOSIS — R00.2 PALPITATIONS: ICD-10-CM

## 2024-12-07 DIAGNOSIS — Z01.30 BLOOD PRESSURE CHECK: ICD-10-CM

## 2024-12-07 DIAGNOSIS — Z87.19 HISTORY OF RECTAL BLEEDING: Primary | ICD-10-CM

## 2024-12-07 NOTE — PROGRESS NOTES
Magda Isabel is a 74 year old female.    Chief Complaint   Patient presents with    Follow - Up     Mb- rm 4 -  follow up for BP and palpitations     Rectal Problem     History of bleeding in July, occasional BRBPR on wiping       HPI:     Pleasant patient with hypothyroidism here with several issues-  She was given amlodipine for elevated BP in last OV with BD but since that time, patient says BP has been normal so not taking the amlodipine. BP today normal on 2 separate checks. She denies any HA/DZ/CP. She does have palpitations but this is an issue for many years. She had holter done in the past and it was normal. She does not want any work up right now because symptoms not really bothering her. She had episode of rectal bleed end of July and went to ER for evaluation. It was felt to be hemorrhoid related but patient is concerned, would like to do colonoscopy sooner than end of 2025 with Dr. Thurston, requesting referral be placed. She still gets occasional BRBPR on wiping.    Patient Active Problem List   Diagnosis    Other specified hypothyroidism    Borderline high cholesterol    History of basal cell carcinoma of skin    S/P total thyroidectomy    Low back pain with radiation    Numbness and tingling in left hand    Family history of diabetes mellitus    Arthritis involving multiple sites    Stage 3a chronic kidney disease (HCC)    Rectal bleeding    Personal history of colonic polyps    Renal insufficiency     Current Outpatient Medications   Medication Sig Dispense Refill    SYNTHROID 100 MCG Oral Tab Take 1 tablet (100 mcg total) by mouth before breakfast. 90 tablet 3    levothyroxine 100 MCG Oral Tab Take 1 tablet (100 mcg total) by mouth before breakfast. 90 tablet 2    Multiple Vitamins-Minerals (MULTI-VITAMIN/MINERALS) Oral Tab Take 1 tablet by mouth daily.      CALCIUM OR Take by mouth.      naproxen 500 MG Oral Tab Take 1 tablet (500 mg total) by mouth 2 (two) times daily with meals. (Patient not  taking: Reported on 12/7/2024) 40 tablet 0      Past Medical History:    Basal cell carcinoma    Diverticulitis    Hypothyroidism      Social History:  Social History     Socioeconomic History    Marital status:    Tobacco Use    Smoking status: Never    Smokeless tobacco: Never   Vaping Use    Vaping status: Never Used   Substance and Sexual Activity    Alcohol use: Not Currently    Drug use: Never     Social Drivers of Health      [facility]    Social Connections    [facility]    Housing Stability     Family History   Problem Relation Age of Onset    Other (Other) Father         cirrohosis    Stroke Mother     Other (Other) Mother         pneumonia        Allergies  Allergies[1]      REVIEW OF SYSTEMS:   GENERAL HEALTH:  no fevers   RESPIRATORY: no cough  CARDIOVASCULAR: denies chest pain  GI: denies abdominal pain  : no dysuria  NEURO: denies headaches  PSYCH: No reported depression   HEME: No adenopathy      EXAM:   /70 (BP Location: Left arm, Patient Position: Sitting, Cuff Size: adult)   Pulse 68   Temp 97.6 °F (36.4 °C) (Temporal)   Ht 5' 2.99\" (1.6 m)   Wt 169 lb 6.4 oz (76.8 kg)   LMP  (LMP Unknown)   SpO2 97%   BMI 30.02 kg/m²   GENERAL: well developed, well nourished,in no apparent distress  HEENT: atraumatic, normocephalic  NECK: supple,no adenopathy  LUNGS: normal rate without respiratory distress, lungs clear to auscultation  CARDIO: RRR nl S1 S2  GI: normal bowel sounds, soft, NT/ND, +ext hemorrhoid  EXTREMITIES: no cyanosis, clubbing or edema  NEURO: Alert and oriented    ASSESSMENT AND PLAN:     Encounter Diagnoses   Name     History of rectal bleeding- likely r/t hemorrhoids, advised on otc hemorroid creams. She would like to see her GI for consideration of colonoscopy sooner than end of 2025- referral given     Palpitations- chronic issue,  advised to avoid or limit caffeine. She did not want any work up at this time          Blood pressure check- BP normal at home and here in office, pt is not taking amlodipine. Removed from her med list        No orders of the defined types were placed in this encounter.      Meds & Refills for this Visit:  Requested Prescriptions      No prescriptions requested or ordered in this encounter       Imaging & Consults:  GASTRO - INTERNAL    Return in about 7 months (around 7/7/2025), or if symptoms worsen or fail to improve, for annual visit.  There are no Patient Instructions on file for this visit.      The patient indicates understanding of these issues and agrees to the plan.           [1]   Allergies  Allergen Reactions    Penicillins ITCHING     As child

## 2024-12-08 PROBLEM — R00.2 PALPITATIONS: Status: ACTIVE | Noted: 2024-12-08

## 2024-12-08 PROBLEM — Z87.19 HISTORY OF RECTAL BLEEDING: Status: ACTIVE | Noted: 2024-12-08

## 2024-12-18 ENCOUNTER — HOSPITAL ENCOUNTER (OUTPATIENT)
Dept: BONE DENSITY | Age: 74
Discharge: HOME | End: 2024-12-18
Attending: INTERNAL MEDICINE
Payer: MEDICARE

## 2024-12-18 DIAGNOSIS — Z78.0 POST-MENOPAUSAL: ICD-10-CM

## 2024-12-18 PROCEDURE — 77080 DXA BONE DENSITY AXIAL: CPT | Performed by: INTERNAL MEDICINE

## 2024-12-23 ENCOUNTER — HOSPITAL ENCOUNTER (OUTPATIENT)
Dept: CV DIAGNOSTICS | Facility: HOSPITAL | Age: 74
Discharge: HOME | End: 2024-12-23
Payer: MEDICARE

## 2024-12-23 DIAGNOSIS — R00.2 PALPITATIONS: ICD-10-CM

## 2024-12-23 DIAGNOSIS — R03.0 ELEVATED BLOOD PRESSURE READING: ICD-10-CM

## 2024-12-23 PROCEDURE — 93225 XTRNL ECG REC<48 HRS REC: CPT

## 2024-12-23 PROCEDURE — 93226 XTRNL ECG REC<48 HR SCAN A/R: CPT

## 2024-12-30 ENCOUNTER — TELEPHONE (OUTPATIENT)
Dept: INTERNAL MEDICINE CLINIC | Facility: CLINIC | Age: 74
End: 2024-12-30

## 2024-12-30 DIAGNOSIS — E78.00 PURE HYPERCHOLESTEROLEMIA: ICD-10-CM

## 2024-12-30 DIAGNOSIS — Z00.00 ENCOUNTER FOR MEDICARE ANNUAL WELLNESS EXAM: Primary | ICD-10-CM

## 2024-12-30 DIAGNOSIS — E03.8 OTHER SPECIFIED HYPOTHYROIDISM: ICD-10-CM

## 2024-12-30 NOTE — TELEPHONE ENCOUNTER
Patient called request labs prior to their annual physical.  Annual physical scheduled for 07/11/25 .   Please order labs. Patient preferred lab is   [facility] ([facility])     Patient informed request was sent to clinical team.  Patient informed to fast for labs.  No callback required.

## 2025-01-09 ENCOUNTER — TELEPHONE (OUTPATIENT)
Dept: INTERNAL MEDICINE CLINIC | Facility: CLINIC | Age: 75
End: 2025-01-09

## 2025-01-09 NOTE — TELEPHONE ENCOUNTER
Called patient to inform of Cardiology appointment for 1/14/25 with Dr. Fung at 0930.  Also informed a MCM was sent via office, ESTHER Felder.  Patient verbalizes understanding and appreciative of the phone call and assistance.

## 2025-01-14 ENCOUNTER — HOSPITAL ENCOUNTER (OUTPATIENT)
Dept: LAB | Facility: HOSPITAL | Age: 75
Discharge: HOME | End: 2025-01-14
Attending: STUDENT IN AN ORGANIZED HEALTH CARE EDUCATION/TRAINING PROGRAM
Payer: MEDICARE

## 2025-01-14 LAB — TSI SER-ACNC: 4.05 UIU/ML (ref 0.55–4.78)

## 2025-01-14 PROCEDURE — 36415 COLL VENOUS BLD VENIPUNCTURE: CPT | Performed by: STUDENT IN AN ORGANIZED HEALTH CARE EDUCATION/TRAINING PROGRAM

## 2025-01-14 PROCEDURE — 84443 ASSAY THYROID STIM HORMONE: CPT | Performed by: STUDENT IN AN ORGANIZED HEALTH CARE EDUCATION/TRAINING PROGRAM

## 2025-01-20 ENCOUNTER — OFFICE VISIT (OUTPATIENT)
Facility: CLINIC | Age: 75
End: 2025-01-20
Payer: MEDICARE

## 2025-01-20 DIAGNOSIS — M54.16 LUMBAR RADICULITIS: ICD-10-CM

## 2025-01-20 DIAGNOSIS — M16.12 PRIMARY OSTEOARTHRITIS OF LEFT HIP: Primary | ICD-10-CM

## 2025-01-20 PROCEDURE — 99214 OFFICE O/P EST MOD 30 MIN: CPT | Performed by: PHYSICIAN ASSISTANT

## 2025-01-20 PROCEDURE — 1160F RVW MEDS BY RX/DR IN RCRD: CPT | Performed by: PHYSICIAN ASSISTANT

## 2025-01-20 PROCEDURE — 1159F MED LIST DOCD IN RCRD: CPT | Performed by: PHYSICIAN ASSISTANT

## 2025-01-20 PROCEDURE — 1125F AMNT PAIN NOTED PAIN PRSNT: CPT | Performed by: PHYSICIAN ASSISTANT

## 2025-01-20 NOTE — PROGRESS NOTES
[facility] Progress Note    Subjective: Patient returns to clinic to discuss her back and leg pain.  She reports after completing physical therapy that she experienced relief of her left lower extremity pain.  She does report feeling that she shalini more forward with her posture.  She also notices cramping in the left anterior thigh even at rest and has a longstanding history of groin pain.    Objective: Patient ambulating independently in clinic today.  Left hip internal rotation approximately 10 degrees with reproduction of groin pain.  External rotation approximately 40 degrees with less pain.  Right hip internal rotation approximately 30 degrees and external rotation to 40 degrees which is painless.    Imaging: Radiographs of the left hip obtained in 2023 personally viewed, independently interpreted and radiology report read.  Radiographs demonstrate mild joint space narrowing of both hips.  Unsure whether x-rays were obtained weightbearing.    Assessment/Plan: 74-year-old female with symptomatic radiographically at least mild left hip osteoarthritis. I discussed the etiology, natural history, and management options for symptomatic hip osteoarthritis.  I discussed nonsurgical and surgical treatments, with nonsurgical treatments to include anti-inflammatory medications, injections, activity modification, weight loss, low impact exercise and possible therapy.  Surgery would be with hip replacement and is an elective operation reserved for when nonsurgical treatments no longer alleviate symptoms sufficiently.  I did discuss with the patient that the thigh pain could be secondary to her osteoarthritis of the hip.  I discussed typically cramping secondary to stenosis in the low back worsens with increased activity, i.e. neurogenic claudication.  Given that her leg pain has improved which we suspected to be secondary to lumbar radiculopathy, no indication for advanced imaging at this point in time.  I discussed  a diagnostic and therapeutic intra-articular left hip injection with Dr. Moctezuma which the patient ultimately endorsed interest in.  This was scheduled for the patient.  Regards to the patient's forward posture, we discussed upper thoracic back braces to use as needed but not to overuse to avoid weakening of the muscles.  She can return to my clinic on an as-needed basis.  Her questions were sought and answered satisfactorily.  She is happy with the plan and will follow as advised.  Approximately 40 minutes were spent in face-to-face interaction with the patient as well as review of her chart and dictation of the note.      Erick Anthony PA-C  [facility]    This note was dictated using Dragon software.  While it was briefly proofread prior to completion, some grammatical, spelling, and word choice errors due to dictation may still occur.

## 2025-01-21 ENCOUNTER — PATIENT MESSAGE (OUTPATIENT)
Facility: CLINIC | Age: 75
End: 2025-01-21

## 2025-01-21 DIAGNOSIS — M16.12 PRIMARY OSTEOARTHRITIS OF LEFT HIP: Primary | ICD-10-CM

## 2025-01-21 DIAGNOSIS — M54.16 LUMBAR RADICULITIS: ICD-10-CM

## 2025-01-21 NOTE — TELEPHONE ENCOUNTER
PT order pending is appropriate.  Thank you!        Magda Isabel to [facility] Clinical Pool (supporting Erick Anthony PA-C)         1/21/25  8:47 AM  Erick,  I know we talked about physical therapy regarding strengthening exercises for the core, back and hip areas.  Would it be possible to restart physical therapy with the same group I used in the past. Constance was my therapist previously. I prefer to take more conservative approach. Please let me know your thoughts.  Thank you.     Magda Isabel

## 2025-02-10 ENCOUNTER — TELEPHONE (OUTPATIENT)
Dept: PHYSICAL THERAPY | Facility: HOSPITAL | Age: 75
End: 2025-02-10

## 2025-02-13 ENCOUNTER — TELEPHONE (OUTPATIENT)
Dept: PHYSICAL THERAPY | Facility: HOSPITAL | Age: 75
End: 2025-02-13

## 2025-02-14 ENCOUNTER — OFFICE VISIT (OUTPATIENT)
Dept: PHYSICAL THERAPY | Age: 75
End: 2025-02-14
Attending: PHYSICIAN ASSISTANT
Payer: MEDICARE

## 2025-02-14 DIAGNOSIS — M54.16 LUMBAR RADICULITIS: ICD-10-CM

## 2025-02-14 DIAGNOSIS — M16.12 PRIMARY OSTEOARTHRITIS OF LEFT HIP: Primary | ICD-10-CM

## 2025-02-14 PROCEDURE — 97161 PT EVAL LOW COMPLEX 20 MIN: CPT | Performed by: PHYSICAL THERAPIST

## 2025-02-14 NOTE — PROGRESS NOTES
SPINE EVALUATION:     Diagnosis:   Primary osteoarthritis of left hip (M16.12)  Lumbar radiculitis (M54.16) Patient:  Magda Isabel (74 year old, female)        Referring Provider: Erick Anthony  Today's Date   2/14/2025    Precautions:  Drug Allergy; Cancer   Date of Evaluation: 02/14/25  Next MD visit: stress test [facility] 3/12  Date of Surgery: n/a     PATIENT SUMMARY   Summary of chief complaints: LLE weakness, L ant hip & inguinal pain; no LBP  History of current condition: Pt reports she was doing fine, completing HEP after PT. January- L hip started acting up again. Pt reports no specific event that triggered the increase in symptoms. Reports a lot more pain to groin & top of thigh. Pt reports Erick offered her a cortisone injection to the hip but would prefer to start with PT first. The R leg is fine. Pt is not sure if something else is involved- nerve, vascular (pt reports she does have varicose veins). Pt would prefer to avoid surgery. Pt is trying to keep moving but feels frustrated. Feels the weakness is getting worse for her, with difficulty lifting the LLE up to get into her car.   Pain level: current 3 /10, at best 2 /10, at worst 4 /10  Description of symptoms: denies numbness; very occasional tingle to the L foot  Prior level of function: hx LBP with PT  Current limitations: stairs, car transfers, walking, bed mobility  Pt goals: I want to get over this  Past medical history was reviewed with Magda. Pt saw a cardiologist dx with brachycardia (this has been her whole life): will have stress test, PET scan was recommended  Magda  has a past medical history of Basal cell carcinoma, Diverticulitis, and Hypothyroidism.  She  has a past surgical history that includes thyroidectomy and thyroidectomy.    ASSESSMENT  Magda presents to physical therapy evaluation with primary c/o LLE weakness, L ant hip & inguinal pain; no LBP. The results of the objective tests and measures show impairments in  posture, bed mobility/ transfers, gait, soft tissue mobility, ROM, strength, positive L hip scour test with additional testing to be completed subsequent session, limited due to time constraints. Functional deficits include but are not limited to stairs, car transfers, walking, bed mobility. Signs and symptoms are consistent with diagnosis of Primary osteoarthritis of left hip (M16.12)  Lumbar radiculitis (M54.16). Pt and PT discussed evaluation findings, pathology, POC and HEP.  Pt voiced understanding and performs HEP correctly without reported pain. Skilled Physical Therapy is medically necessary to address the above impairments and reach functional goals.    OBJECTIVE:   Limited secondary to time constraints of evaluation (pt had to leave early), additional measures TBA subsequent session    Musculoskeletal:  Observation/Posture: ant/ fwd trunk lean; req use of HHA to lift the LLE to transfer from sit->supine with increased challenge noted   Palpation: TTP with increased tension/ muscle guarding L adductors & TFL     Special tests:   +hip scour test L     RAISA ROM and Strength:  (* denotes performed with pain)  Hip   ROM MMT (-/5)    R L R L     Flex (L2) 105 (AROM) 95*/ 108* (AROM/ PROM) 4+ 2+*     ER   NT   NT 5 4+     Flexibility: NT     Balance and Functional Mobility:  Gait: pt ambulates on level ground with stooped posture/forward lean; decreased stance phase; trendelenburg/waddle (crossover LLE).    Today's Treatment and Response:   Pt education was provided on exam findings, treatment diagnosis, treatment plan, expectations, and prognosis.  Today's Treatment       2/14/2025   PT Treatment   Evaluation Min 30   Total of Timed Procedures 0   Total of Service Based 30   Total Treatment Time 30         Patient was instructed in and issued a HEP for: TBD    Charges:  PT EVAL: Low Complexity, Limited 2/2 pt's time constraints  In agreement with evaluation findings and clinical rationale, this evaluation  involved LOW COMPLEXITY decision making due to no personal factors/comorbidities, 1-2 body structures involved/activity limitations, and stable symptoms as documented in the evaluation.                                                                         PLAN OF CARE:    Goals: (to be met in 8 visits)   Goals       Therapy Goals      Not Met Progress Toward Partially Met Met   Pt will have improved L hip AROM Flex to 105 deg to be able perform bed mobility and perform car transfers without difficulty. [] [] [] []   Pt will demo increased upright stance, more symmetrical stance phase, reduced trendelenburg/waddle & crossover gait pattern for improved tolerance to walking at home & in the community. [] [] [] []   Pt will improve functional hip strength to report ability to ascend/descend 1 flight of stairs reciprocally with use of 1 handrail. [] [] [] []   Pt will be independent and compliant with comprehensive HEP to maintain progress achieved in PT. [] [] [] []                  Frequency / Duration: Patient will be seen 1-2x/week or a total of 8  visits over a 90 day period. Treatment will include: Gait training; Manual Therapy; Neuromuscular Re-education; Self-Care Home Management; Therapeutic Exercise; Home Exercise Program instruction; Therapeutic Activities    Education or treatment limitation: None   Rehab Potential: good     LEFS Score  LEFS Score: (Patient-Rptd) 80 % (2/7/2025  5:47 PM)      Patient/Family/Caregiver was advised of these findings, precautions, and treatment options and has agreed to actively participate in planning and for this course of care.    Thank you for your referral. Please co-sign or sign and return this letter via fax as soon as possible to [phone]. If you have any questions, please contact me at Dept: [phone]    Sincerely,  Electronically signed by therapist: Constance Hernandez, PT  Physician's certification required: Yes  I certify the need for these services furnished under  this plan of treatment and while under my care.    X___________________________________________________ Date____________________    Certification From: 2/14/2025  To:5/15/2025

## 2025-02-17 ENCOUNTER — OFFICE VISIT (OUTPATIENT)
Dept: PHYSICAL THERAPY | Age: 75
End: 2025-02-17
Attending: PHYSICIAN ASSISTANT
Payer: MEDICARE

## 2025-02-17 PROCEDURE — 97110 THERAPEUTIC EXERCISES: CPT | Performed by: PHYSICAL THERAPIST

## 2025-02-17 NOTE — PROGRESS NOTES
Patient: Magda Isabel (74 year old, female) Referring Provider:  Insurance:   Diagnosis: Primary osteoarthritis of left hip (M16.12)  Lumbar radiculitis (M54.16) Erick Anthony  [facility]   Date of Surgery: n/a Next MD visit:  N/A   Precautions:  Drug Allergy; Cancer stress test MCI 3/11 Referral Information:    Date of Evaluation: Req: 0, Auth: 0, Exp:     02/14/25 POC Auth Visits:  8       Today's Date   2/17/2025    Subjective  pt reports completed some stretches Saturday; belongs to [employer]- Sunday started out on stationary bike for 5 miles (35 min), went for a walk around the track- went about a 1/2 mi, the leg was bothering her a lot & when she got home was really hurting, sore all night. +tired today, did not sleep well, does not have a lot of energy. Right now not bothering her as much. Pt having some cramping.       Pain: 3/10     Objective  see flowsheet for details    Assessment  Pt expressed feeling significant tightness to calf musculature with slantboard gastroc stretch. Pt unable to effectively complete modified benji hip flexor stretch off side of table today likely due to combination of pain & joint/ soft tissue restrictions & thus deferred this today but pt may benefit from revisiting in future. Fair tolerance to manual PROM. Good tolerance to new supine strengthening.    Goals (to be met in 8 visits)   Goals       Therapy Goals      Not Met Progress Toward Partially Met Met   Pt will have improved L hip AROM Flex to 105 deg to be able perform bed mobility and perform car transfers without difficulty. [] [] [] []   Pt will demo increased upright stance, more symmetrical stance phase, reduced trendelenburg/waddle & crossover gait pattern for improved tolerance to walking at home & in the community. [] [] [] []   Pt will improve functional hip strength to report ability to ascend/descend 1 flight of stairs reciprocally with use of 1 handrail. [] [] [] []   Pt will be  independent and compliant with comprehensive HEP to maintain progress achieved in PT. [] [] [] []                 Plan  Cont with mobility work, LE strengthening as tolerated avoiding increased pain.  Pt is aware PT will be out of office from 2/20 & returning 2/26 & will contact central scheduling/ PFN direct line if any issues arise during this time. Next appointment confirmed.    Treatment Last 4 Visits       2/14/2025 2/17/2025   PT Treatment   Treatment Day  2   Therapeutic Exercise  Education: symptoms pathophysiology, PT impression from IE. Pt may reach out to specialist(s) while completing PT POC. Pt should hold off from any pilates yet.  NuStep L1 X 5'  Slantboard gastroc stretch 2 X 30\" B heels on ground  Standing hip flexor stretch 2 X 30\" L  Supine BKFO DL 5\" X 10  Supine hip add carmencita yellow ball 5\" 2 X 10  Supine hip abd carmencita green pilates ring 5\" 2 X 10  Supine bridge 2 X 10 small range  Attempt L modified benji stretch- defer  Manual PROM L hip flex, IR, ER to tolerance   Therapeutic Exercise Min  41   Evaluation Min 30    Total of Timed Procedures 0 41   Total of Service Based 30 0   Total Treatment Time 30 41         HEP  Access Code: [email or web address]  URL: [email or web address]  Date: 02/18/2025  Prepared by: Constance Hernandez    Exercises  - Supine Hip Adduction Isometric with Ball  - 1 x daily - 4 x weekly - 2 sets - 10 reps - 5 sec hold  - Hooklying Clamshell with Resistance  - 1 x daily - 4 x weekly - 2 sets - 10 reps - 5 sec hold  - Beginner Bridge  - 1 x daily - 4 x weekly - 2 sets - 10 reps - 5 sec hold    Charges     Therex X 3

## 2025-02-18 NOTE — PATIENT INSTRUCTIONS
Access Code: [email or web address]  URL: [email or web address]  Date: 02/18/2025  Prepared by: Constance Hernandez    Exercises  - Supine Hip Adduction Isometric with Ball  - 1 x daily - 4 x weekly - 2 sets - 10 reps - 5 sec hold  - Hooklying Clamshell with Resistance  - 1 x daily - 4 x weekly - 2 sets - 10 reps - 5 sec hold  - Beginner Bridge  - 1 x daily - 4 x weekly - 2 sets - 10 reps - 5 sec hold

## 2025-02-19 ENCOUNTER — OFFICE VISIT (OUTPATIENT)
Dept: PHYSICAL THERAPY | Age: 75
End: 2025-02-19
Attending: PHYSICIAN ASSISTANT
Payer: MEDICARE

## 2025-02-19 PROCEDURE — 97110 THERAPEUTIC EXERCISES: CPT | Performed by: PHYSICAL THERAPIST

## 2025-02-19 NOTE — PROGRESS NOTES
Patient: Magda Isabel (74 year old, female) Referring Provider:  Insurance:   Diagnosis: Primary osteoarthritis of left hip (M16.12)  Lumbar radiculitis (M54.16) Erick Frank Anthony  [facility]   Date of Surgery: n/a Next MD visit:  N/A   Precautions:  Drug Allergy; Cancer stress test MCI 3/11 Referral Information:    Date of Evaluation: Req: 0, Auth: 0, Exp:     02/14/25 POC Auth Visits:  8       Today's Date   2/19/2025    Subjective  pt reports things let up a bit not doing things but getting up gets a limp, tried doing some stretches from PT, didn't overdo it; does feel she may have slept better last night; feels the manual stretching helped       Pain: 3/10     Objective  see flowsheet for details  Hip       2/14/2025 2/19/2025   Hip ROM/MMT   Rt Hip Flexion 105       AROM    Lt Hip Flexion 95*/ 108*       AROM/ PROM    Rt Hip Flexion MMT (L2) 4+    Lt Hip Flexion MMT (L2) 2+*    Rt Hip ER  NT   Lt Hip ER  carina limited       supine PROM   Rt Hip ER MMT 5    Lt Hip ER MMT 4+    Rt Hip IR  NT   Lt Hip IR  carina limited       supine PROM     Assessment  Initial carina restriction passively with L hip IR & ER in supine improved to mod-carina restriction with manual PROM/ stretch. Pt described some discomfort with lifting the LLE for standing march as well as WB through the LLE. Advised pt to avoid pushing through increasing pain.    Goals (to be met in 8 visits)   Goals       Therapy Goals      Not Met Progress Toward Partially Met Met   Pt will have improved L hip AROM Flex to 105 deg to be able perform bed mobility and perform car transfers without difficulty. [] [] [] []   Pt will demo increased upright stance, more symmetrical stance phase, reduced trendelenburg/waddle & crossover gait pattern for improved tolerance to walking at home & in the community. [] [] [] []   Pt will improve functional hip strength to report ability to ascend/descend 1 flight of stairs reciprocally with use of 1 handrail. [] [] [] []    Pt will be independent and compliant with comprehensive HEP to maintain progress achieved in PT. [] [] [] []                 Plan  Cont with mobility work, LE strengthening as tolerated avoiding increased pain. Pt is aware PT will be out of office from 2/20 & returning 2/26 & will contact central scheduling/ PFN direct line if any issues arise during this time. Next appointment confirmed.    Treatment Last 4 Visits       2/14/2025 2/17/2025 2/19/2025   PT Treatment   Treatment Day  2 3   Therapeutic Exercise  Education: symptoms pathophysiology, PT impression from IE. Pt may reach out to specialist(s) while completing PT POC. Pt should hold off from any pilates yet.  NuStep L1 X 5'  Slantboard gastroc stretch 2 X 30\" B heels on ground  Standing hip flexor stretch 2 X 30\" L  Supine BKFO DL 5\" X 10  Supine hip add carmencita yellow ball 5\" 2 X 10  Supine hip abd carmencita green pilates ring 5\" 2 X 10  Supine bridge 2 X 10 small range  Attempt L modified benji stretch- defer  Manual PROM L hip flex, IR, ER to tolerance Education: affect of weather; pilates ring  NuStep L1 X 6'  Slantboard gastroc stretch 2 X 30\" B heels on ground  Standing in // bars with HHA:  -calf raise B 2 X 10  -alt LE march X 5 ea R/L  -hip 3-way X 5 ea R/L  Supine BKFO DL 5\" X 10  Manual PROM L hip flex, IR, ER to tolerance   Therapeutic Exercise Min  41 40   Evaluation Min 30     Total of Timed Procedures 0 41 40   Total of Service Based 30 0 0   Total Treatment Time 30 41 40         HEP  Access Code: [email or web address]  URL: [email or web address]  Date: 02/19/2025  Prepared by: Constance Hernandez    Exercises  - Bent Knee Fallouts  - 1 x daily - 5 x weekly - 2 sets - 10 reps  - Heel Raises with Counter Support  - 1 x daily - 5 x weekly - 2 sets - 10 reps  - Standing March with Counter Support  - 1 x daily - 3 x weekly - 1 sets - 10 reps  - Standing Hip Abduction with Counter Support  - 1 x daily - 3 x weekly - 1 sets - 10 reps  - Standing Hip  Extension with Counter Support  - 1 x daily - 3 x weekly - 1 sets - 10 reps    Charges     Therex X 3

## 2025-02-19 NOTE — PATIENT INSTRUCTIONS
Access Code: [email or web address]  URL: [email or web address]  Date: 02/19/2025  Prepared by: Constance Hernandez    Exercises  - Bent Knee Fallouts  - 1 x daily - 5 x weekly - 2 sets - 10 reps  - Heel Raises with Counter Support  - 1 x daily - 5 x weekly - 2 sets - 10 reps  - Standing March with Counter Support  - 1 x daily - 3 x weekly - 1 sets - 10 reps  - Standing Hip Abduction with Counter Support  - 1 x daily - 3 x weekly - 1 sets - 10 reps  - Standing Hip Extension with Counter Support  - 1 x daily - 3 x weekly - 1 sets - 10 reps

## 2025-02-26 ENCOUNTER — OFFICE VISIT (OUTPATIENT)
Dept: PHYSICAL THERAPY | Age: 75
End: 2025-02-26
Attending: PHYSICIAN ASSISTANT
Payer: MEDICARE

## 2025-02-26 PROCEDURE — 97110 THERAPEUTIC EXERCISES: CPT | Performed by: PHYSICAL THERAPIST

## 2025-02-26 NOTE — PROGRESS NOTES
Patient: Magda Isabel (74 year old, female) Referring Provider:  Insurance:   Diagnosis: Primary osteoarthritis of left hip (M16.12)  Lumbar radiculitis (M54.16) Erick Anthony  [facility]   Date of Surgery: n/a Next MD visit:  N/A   Precautions:  Drug Allergy; Cancer stress test MCI 3/11 Referral Information:    Date of Evaluation: Req: 0, Auth: 0, Exp:     02/14/25 POC Auth Visits:  8       Today's Date   2/26/2025    Subjective  pt reports she has been resting this morning; completing the stretching; does see a slight improvement. when she gets more tired she will feel symptoms more, cont with issue with exiting vehicle; did do a walk for about 30 min; did feel helpful with the manual stretching; did the bike for about 15' at the [facility]       Pain: 2/10     Objective  see flowsheet for details    Trunk (lumbar)      2/26/2025   Trunk ROM/MMT   Trunk Flex WNL       stretch only   Trunk Extension 75%       tightness to the back, pulling to L inguinal region   Trunk Rt Side Bend 75%       tightness   Trunk Lt Side Bend 75%       tightness   Trunk Rt Rotation 50%   Trunk Lt Rotation 50%       pt reports tighter on the L   , Hip       2/14/2025 2/19/2025 2/26/2025   Hip ROM/MMT   Rt Hip Flexion 105       AROM     Lt Hip Flexion 95*/ 108*       AROM/ PROM     Rt Hip Flexion MMT (L2) 4+     Lt Hip Flexion MMT (L2) 2+*     Rt Hip ER  NT NT   Lt Hip ER  carina limited       supine PROM NT   Rt Hip ER MMT 5  5   Lt Hip ER MMT 4+  4+   Rt Hip IR  NT NT   Lt Hip IR  carina limited       supine PROM NT   Rt Hip IR MMT   5   Lt Hip IR MMT   4+   , Knee       2/26/2025   Knee ROM/MMT   Rt Knee Extension MMT (L3) 5   Lt Knee Extension MMT (L3) 5   , Ankle/Foot       2/26/2025   Ankle/Foot ROM/MMT   Rt Foot/Ank Pf MMT (S1) 5   Lt Foot/Ank Pf MMT (S1) 5   Rt Foot/Ank Df MMT (L4) 5   Lt Foot/Ank Df MMT (L4) 5   Rt Great Toe Extension MMT (L5) 5   Lt Great Toe Extension MMT (L5) 5     Assessment  Pt challenged with  performing squat taps with hip hinge. Broke down the exercise to just hip hinge. Pt cont with challenge with tendency to bend at upper back vs maintain neutral spine. Will cont to review this to promote improved form.    Goals (to be met in 8 visits)   Goals       Therapy Goals      Not Met Progress Toward Partially Met Met   Pt will have improved L hip AROM Flex to 105 deg to be able perform bed mobility and perform car transfers without difficulty. [] [] [] []   Pt will demo increased upright stance, more symmetrical stance phase, reduced trendelenburg/waddle & crossover gait pattern for improved tolerance to walking at home & in the community. [] [] [] []   Pt will improve functional hip strength to report ability to ascend/descend 1 flight of stairs reciprocally with use of 1 handrail. [] [] [] []   Pt will be independent and compliant with comprehensive HEP to maintain progress achieved in PT. [] [] [] []                 Plan  Review hip hinge & squat form.    Treatment Last 4 Visits       2/14/2025 2/17/2025 2/19/2025 2/26/2025   PT Treatment   Treatment Day  2 3 4   Therapeutic Exercise  Education: symptoms pathophysiology, PT impression from IE. Pt may reach out to specialist(s) while completing PT POC. Pt should hold off from any pilates yet.  NuStep L1 X 5'  Slantboard gastroc stretch 2 X 30\" B heels on ground  Standing hip flexor stretch 2 X 30\" L  Supine BKFO DL 5\" X 10  Supine hip add carmencita yellow ball 5\" 2 X 10  Supine hip abd carmencita green pilates ring 5\" 2 X 10  Supine bridge 2 X 10 small range  Attempt L modified benji stretch- defer  Manual PROM L hip flex, IR, ER to tolerance Education: affect of weather; pilates ring  NuStep L1 X 6'  Slantboard gastroc stretch 2 X 30\" B heels on ground  Standing in // bars with HHA:  -calf raise B 2 X 10  -alt LE march X 5 ea R/L  -hip 3-way X 5 ea R/L  Supine BKFO DL 5\" X 10  Manual PROM L hip flex, IR, ER to tolerance Education: pacing self with walking  NuStep L2  X 6'  Cont assess: lumbar ROM, cont LE strength - guiding TX interventions (lumbar vs hip drivers of symptoms)  Manual PROM L hip flex, IR, ER to tolerance  Squat taps to high tableside  Hip hinge instruction   Therapeutic Exercise Min  41 40 40   Evaluation Min 30      Total of Timed Procedures 0 41 40 40   Total of Service Based 30 0 0 0   Total Treatment Time 30 41 40 40         HEP  Access Code: [email or web address]  URL: [email or web address]  Date: 02/19/2025  Prepared by: Constance Hernandez    Exercises  - Bent Knee Fallouts  - 1 x daily - 5 x weekly - 2 sets - 10 reps  - Heel Raises with Counter Support  - 1 x daily - 5 x weekly - 2 sets - 10 reps  - Standing March with Counter Support  - 1 x daily - 3 x weekly - 1 sets - 10 reps  - Standing Hip Abduction with Counter Support  - 1 x daily - 3 x weekly - 1 sets - 10 reps  - Standing Hip Extension with Counter Support  - 1 x daily - 3 x weekly - 1 sets - 10 reps    Charges     Therex X 3

## 2025-02-28 ENCOUNTER — APPOINTMENT (OUTPATIENT)
Dept: PHYSICAL THERAPY | Age: 75
End: 2025-02-28
Attending: PHYSICIAN ASSISTANT
Payer: MEDICARE

## 2025-03-04 ENCOUNTER — OFFICE VISIT (OUTPATIENT)
Dept: PHYSICAL THERAPY | Age: 75
End: 2025-03-04
Attending: PHYSICIAN ASSISTANT
Payer: MEDICARE

## 2025-03-04 PROCEDURE — 97110 THERAPEUTIC EXERCISES: CPT | Performed by: PHYSICAL THERAPIST

## 2025-03-04 NOTE — PROGRESS NOTES
Patient: Magda Isabel (74 year old, female) Referring Provider:  Insurance:   Diagnosis: Primary osteoarthritis of left hip (M16.12)  Lumbar radiculitis (M54.16) Erick Anthony  [facility]   Date of Surgery: n/a Next MD visit:  N/A   Precautions:  Drug Allergy; Cancer stress test MCI 3/11 Referral Information:    Date of Evaluation: Req: 0, Auth: 0, Exp:     02/14/25 POC Auth Visits:  8       Today's Date   3/4/2025    Subjective  symptoms after prolonged walking; reports always the issue with stairs (more up than down) & getting in/ out same amount of pain; does not have pain to the top of the thigh; but still has at the groin; curious if she should get an MRI? feels a lot of tightness a lot       Pain: -- (2-3)     Objective  see flowsheet for details       Assessment  Pt is advised to direct any imaging related questions to referring provider especially due to lingering symptoms despite PT intervention. Pt had improved ability to complete hip hinge with squat taps maintaining neutral spine today compared to previous visit, though provided verbal cues for activating glutes & quads.    Goals (to be met in 8 visits)    Goals       Therapy Goals      Not Met Progress Toward Partially Met Met   Pt will have improved L hip AROM Flex to 105 deg to be able perform bed mobility and perform car transfers without difficulty. [] [] [] []   Pt will demo increased upright stance, more symmetrical stance phase, reduced trendelenburg/waddle & crossover gait pattern for improved tolerance to walking at home & in the community. [] [] [] []   Pt will improve functional hip strength to report ability to ascend/descend 1 flight of stairs reciprocally with use of 1 handrail. [] [] [] []   Pt will be independent and compliant with comprehensive HEP to maintain progress achieved in PT. [] [] [] []                Plan  Cont with ROM/ mobility & strengthening.    Treatment Last 4 Visits       2/17/2025 2/19/2025 2/26/2025  3/4/2025   PT Treatment   Treatment Day 2 3 4 5   Therapeutic Exercise Education: symptoms pathophysiology, PT impression from IE. Pt may reach out to specialist(s) while completing PT POC. Pt should hold off from any pilates yet.  NuStep L1 X 5'  Slantboard gastroc stretch 2 X 30\" B heels on ground  Standing hip flexor stretch 2 X 30\" L  Supine BKFO DL 5\" X 10  Supine hip add carmencita yellow ball 5\" 2 X 10  Supine hip abd carmencita green pilates ring 5\" 2 X 10  Supine bridge 2 X 10 small range  Attempt L modified benji stretch- defer  Manual PROM L hip flex, IR, ER to tolerance Education: affect of weather; pilates ring  NuStep L1 X 6'  Slantboard gastroc stretch 2 X 30\" B heels on ground  Standing in // bars with HHA:  -calf raise B 2 X 10  -alt LE march X 5 ea R/L  -hip 3-way X 5 ea R/L  Supine BKFO DL 5\" X 10  Manual PROM L hip flex, IR, ER to tolerance Education: pacing self with walking  NuStep L2 X 6'  Cont assess: lumbar ROM, cont LE strength - guiding TX interventions (lumbar vs hip drivers of symptoms)  Manual PROM L hip flex, IR, ER to tolerance  Squat taps to high tableside  Hip hinge instruction    Therapeutic Exercise Min 41 40 40    Total of Timed Procedures 41 40 40    Total of Service Based 0 0 0    Total Treatment Time 41 40 40           3/4/2025   Spine Treatment   Therapeutic Exercise NuStep L3 X 6'  Pt education: instructed to defer any imaging ?s to referring provider, as well as reach out re: ongoing/ lingering symptoms  Supine hip abd carmencita black pilates ring 5\" 2 X 10  SB DKTC 5\" 2 X 15  SB LTR X 10 ea  SB bridge 2 X 10  Clams 2 X 15 L  Reverse clams 2 X 15 L  Squat taps to high tableside with hip hinge X 15  Manual PROM L hip flex, IR, ER to tolerance   Therapeutic Exercise Minutes 40   Total Time Of Timed Procedures 40   Total Time Of Service-Based Procedures 0   Total Treatment Time 40        HEP    No changes/ updates today    Charges  TE X 3

## 2025-03-07 ENCOUNTER — OFFICE VISIT (OUTPATIENT)
Dept: PHYSICAL THERAPY | Age: 75
End: 2025-03-07
Attending: PHYSICIAN ASSISTANT
Payer: MEDICARE

## 2025-03-07 PROCEDURE — 97110 THERAPEUTIC EXERCISES: CPT | Performed by: PHYSICAL THERAPIST

## 2025-03-07 NOTE — PATIENT INSTRUCTIONS
Access Code: [email or web address]  URL: [email or web address]  Date: 03/07/2025  Prepared by: Constance Hernandez    Exercises  - Bridge with Heels on Swiss Ball  - 1 x daily - 3 x weekly - 2 sets - 15 reps  - Supine Lower Trunk Rotation with Swiss Ball  - 1 x daily - 3 x weekly - 1 sets - 10-15 reps - 5 sec hold  - Supine Knees to Chest with Swiss Ball  - 1 x daily - 3 x weekly - 2 sets - 15 reps  - Sidelying Reverse Clamshell  - 1 x daily - 3 x weekly - 2 sets - 15 reps  - Clamshell  - 1 x daily - 3 x weekly - 2 sets - 15 reps  - Supine Bilateral Isometric Hip Flexion  - 1 x daily - 3 x weekly - 2 sets - 10 reps - 5 sec hold

## 2025-03-07 NOTE — PROGRESS NOTES
Patient: Magda Isabel (74 year old, female) Referring Provider:  Insurance:   Diagnosis: Primary osteoarthritis of left hip (M16.12)  Lumbar radiculitis (M54.16) Erick Anthony  [facility]   Date of Surgery: n/a Next MD visit:  N/A   Precautions:  Drug Allergy; Cancer 3/11 Erick Anthony PA follow up Referral Information:    Date of Evaluation: Req: 0, Auth: 0, Exp:     02/14/25 POC Auth Visits:  8       Today's Date   3/7/2025    Subjective  cont with difficulty with raising up the leg, no other changes to report       Pain: 0/10     Objective  see flowsheet for details       Assessment  Pt did well with progression of strengthening today being mindful to avoid increased pain. More challenged as expected with lat vs fwd step ups.    Goals (to be met in 8 visits)    Goals       Therapy Goals      Not Met Progress Toward Partially Met Met   Pt will have improved L hip AROM Flex to 105 deg to be able perform bed mobility and perform car transfers without difficulty. [] [] [] []   Pt will demo increased upright stance, more symmetrical stance phase, reduced trendelenburg/waddle & crossover gait pattern for improved tolerance to walking at home & in the community. [] [] [] []   Pt will improve functional hip strength to report ability to ascend/descend 1 flight of stairs reciprocally with use of 1 handrail. [] [] [] []   Pt will be independent and compliant with comprehensive HEP to maintain progress achieved in PT. [] [] [] []                    Plan  Assess for update following appt with ortho PA (referring)    Treatment Last 4 Visits       2/19/2025 2/26/2025 3/4/2025 3/7/2025   PT Treatment   Treatment Day 3 4 5 6   Therapeutic Exercise Education: affect of weather; pilates ring  NuStep L1 X 6'  Slantboard gastroc stretch 2 X 30\" B heels on ground  Standing in // bars with HHA:  -calf raise B 2 X 10  -alt LE march X 5 ea R/L  -hip 3-way X 5 ea R/L  Supine BKFO DL 5\" X 10  Manual PROM L hip flex, IR,  ER to tolerance Education: pacing self with walking  NuStep L2 X 6'  Cont assess: lumbar ROM, cont LE strength - guiding TX interventions (lumbar vs hip drivers of symptoms)  Manual PROM L hip flex, IR, ER to tolerance  Squat taps to high tableside  Hip hinge instruction     Therapeutic Exercise Min 40 40     Total of Timed Procedures 40 40     Total of Service Based 0 0     Total Treatment Time 40 40            2/19/2025 2/26/2025 3/4/2025 3/7/2025   Spine Treatment   Treatment Day 3 4 5 6   Therapeutic Exercise   NuStep L3 X 6'  Pt education: instructed to defer any imaging ?s to referring provider, as well as reach out re: ongoing/ lingering symptoms  Supine hip abd carmencita black pilates ring 5\" 2 X 10  SB DKTC 5\" 2 X 15  SB LTR X 10 ea  SB bridge 2 X 10  Clams 2 X 15 L  Reverse clams 2 X 15 L  Squat taps to high tableside with hip hinge X 15  Manual PROM L hip flex, IR, ER to tolerance NuStep L3 X 7'  Supine alt LE march X 10 ea  Supine B Hip flex carmencita 5\" with BLE on SB 2 X 10  SB DKTC 5\" 2 X 15  Sit<>stand 2 X 10  Fwd step up 6\" with HHA X 15  Lat step up 4\" with HHA X 15 with instruction in how pt can complete with box/ step at the fitness center  Shuttle DL press 25#->31# X 20 with instruction in how to complete at fitness center- may need to reduce weight, stop if painful; ask     Next: Standing carmencita hip flex at wall (small ball)   Therapeutic Exercise Minutes   40 43   Total Time Of Timed Procedures   40 43   Total Time Of Service-Based Procedures   0 0   Total Treatment Time   40 43   HEP    Access Code: [email or web address]  URL: [email or web address]  Date: 03/07/2025  Prepared by: Constance Hernandez    Exercises  - Bridge with Heels on Swiss Ball  - 1 x daily - 3 x weekly - 2 sets - 15 reps  - Supine Lower Trunk Rotation with Swiss Ball  - 1 x daily - 3 x weekly - 1 sets - 10-15 reps - 5 sec hold  - Supine Knees to Chest with Swiss Ball  - 1 x daily - 3 x weekly - 2 sets - 15 reps  - Sidelying  Reverse Clamshell  - 1 x daily - 3 x weekly - 2 sets - 15 reps  - Clamshell  - 1 x daily - 3 x weekly - 2 sets - 15 reps  - Supine Bilateral Isometric Hip Flexion  - 1 x daily - 3 x weekly - 2 sets - 10 reps - 5 sec hold        HEP  Access Code: [email or web address]  URL: [email or web address]  Date: 03/07/2025  Prepared by: Constance Hernandez    Exercises  - Bridge with Heels on Swiss Ball  - 1 x daily - 3 x weekly - 2 sets - 15 reps  - Supine Lower Trunk Rotation with Swiss Ball  - 1 x daily - 3 x weekly - 1 sets - 10-15 reps - 5 sec hold  - Supine Knees to Chest with Swiss Ball  - 1 x daily - 3 x weekly - 2 sets - 15 reps  - Sidelying Reverse Clamshell  - 1 x daily - 3 x weekly - 2 sets - 15 reps  - Clamshell  - 1 x daily - 3 x weekly - 2 sets - 15 reps  - Supine Bilateral Isometric Hip Flexion  - 1 x daily - 3 x weekly - 2 sets - 10 reps - 5 sec hold    Charges  3TE

## 2025-03-10 ENCOUNTER — TELEPHONE (OUTPATIENT)
Facility: CLINIC | Age: 75
End: 2025-03-10

## 2025-03-10 DIAGNOSIS — M25.552 LEFT HIP PAIN: Primary | ICD-10-CM

## 2025-03-11 ENCOUNTER — OFFICE VISIT (OUTPATIENT)
Facility: CLINIC | Age: 75
End: 2025-03-11
Payer: MEDICARE

## 2025-03-11 ENCOUNTER — HOSPITAL ENCOUNTER (OUTPATIENT)
Dept: GENERAL RADIOLOGY | Age: 75
Discharge: HOME | End: 2025-03-11
Attending: PHYSICIAN ASSISTANT
Payer: MEDICARE

## 2025-03-11 DIAGNOSIS — M16.12 PRIMARY OSTEOARTHRITIS OF LEFT HIP: Primary | ICD-10-CM

## 2025-03-11 DIAGNOSIS — M25.552 LEFT HIP PAIN: ICD-10-CM

## 2025-03-11 PROCEDURE — 99213 OFFICE O/P EST LOW 20 MIN: CPT | Performed by: PHYSICIAN ASSISTANT

## 2025-03-11 PROCEDURE — 73502 X-RAY EXAM HIP UNI 2-3 VIEWS: CPT | Performed by: PHYSICIAN ASSISTANT

## 2025-03-11 PROCEDURE — 1159F MED LIST DOCD IN RCRD: CPT | Performed by: PHYSICIAN ASSISTANT

## 2025-03-11 NOTE — PROGRESS NOTES
[facility] Progress Note    Subjective: Patient returns to clinic after last seeing me on 1/20/2025.  Despite physical therapy, the patient has continued to experience groin and anterior thigh pain especially with hip flexion.  She she does not take any medications for pain.     Objective: Patient ambulating independently in clinic today.  Internal rotation of the left hip to approximately 10 degrees and external rotation to 45 degrees.  Internal rotation reproduces pain complaint.  Stinchfield test positive.    Imaging: Weightbearing radiographs of the left hip obtained today personally viewed, independently interpreted and radiology report read.  Radiographs demonstrate moderate to severe joint space narrowing of both hips, both progressed since previous radiographs on 3/27/2023.    Assessment/Plan: 74-year-old female with symptomatic radiographically moderate to severe left hip osteoarthritis. I discussed the etiology, natural history, and management options for symptomatic hip osteoarthritis.  I discussed nonsurgical and surgical treatments, with nonsurgical treatments to include anti-inflammatory medications, injections, activity modification, weight loss, low impact exercise and possible therapy.  Surgery would be with hip replacement and is an elective operation reserved for when nonsurgical treatments no longer alleviate symptoms sufficiently.  I explained to the patient that very likely that osteoarthritis is affecting her range of motion of the hip as well as the degree of pain that she feels with hip flexion.  Patient understands the discussion and would like to stick with noninvasive measures to control her pain.  Namely, we discussed turmeric supplementation and she will pursue this.  She politely declined pursuing injection at today's juncture.      Erick Anthony PA-C  [facility]    This note was dictated using Dragon software.  While it was briefly proofread prior to  completion, some grammatical, spelling, and word choice errors due to dictation may still occur.

## 2025-03-14 ENCOUNTER — OFFICE VISIT (OUTPATIENT)
Dept: PHYSICAL THERAPY | Age: 75
End: 2025-03-14
Attending: PHYSICIAN ASSISTANT
Payer: MEDICARE

## 2025-03-14 PROCEDURE — 97110 THERAPEUTIC EXERCISES: CPT | Performed by: PHYSICAL THERAPIST

## 2025-03-14 NOTE — PROGRESS NOTES
Patient: Magda Isabel (74 year old, female) Referring Provider:  Insurance:   Diagnosis: Primary osteoarthritis of left hip (M16.12)  Lumbar radiculitis (M54.16) Erick Anthony  [facility]   Date of Surgery: n/a Next MD visit:  N/A   Precautions:  Drug Allergy; Cancer 3/11 Erick BRANNON follow up Referral Information:    Date of Evaluation: Req: 0, Auth: 0, Exp:     02/14/25 POC Auth Visits:  8       Discharge Summary  Pt has attended 7 visits in Physical Therapy.     Today's Date   3/14/2025    Subjective  pt reports she had new xrays; will hold off on the injection for now; cont to use [facility], walking; will get  at [facility] [facility] [facility]  Does see some slight results with the lifting  Cont with challenge with going up/ down stairs  Walking feels better- less of a limp       Pain:  2/10     Objective  see flowsheet for details       Hip       2/19/2025 2/26/2025 3/14/2025   Hip ROM/MMT   Lt Hip Flexion   100/ 115   Rt Hip Flexion MMT (L2)   NT   Lt Hip Flexion MMT (L2)   4-   Rt Hip ER NT NT NT   Lt Hip ER carina limited       supine PROM NT mod restricted   Rt Hip ER MMT  5    Lt Hip ER MMT  4+    Rt Hip IR NT NT NT   Lt Hip IR carina limited       supine PROM NT mod-carina restricted   Rt Hip IR MMT  5    Lt Hip IR MMT  4+       Assessment  Pt has made progress wtih hip A/PROM. Pt advised to contact PT if questions/ concerns arise while performing HEP. Pt advised to follow up with referring provider and/ or PCP if symptoms increase despite adherence to HEP. Pt is aware that if symptoms recur or increase, pt may be appropriate to return to skilled PT under new POC w/ updated RX if deemed medically necessary. Pt is in agreement with discharge plans. Thank you.    Goals (to be met in 8 visits)    Goals       Therapy Goals      Not Met Progress Toward Partially Met Met   Pt will have improved L hip AROM Flex to 105 deg to be able perform bed mobility and perform car transfers without difficulty. []  [x] [] []   Pt will demo increased upright stance, more symmetrical stance phase, reduced trendelenburg/waddle & crossover gait pattern for improved tolerance to walking at home & in the community. [] [] [] [x]   Pt will improve functional hip strength to report ability to ascend/descend 1 flight of stairs reciprocally with use of 1 handrail. [x] [] [] []   Pt will be independent and compliant with comprehensive HEP to maintain progress achieved in PT. [] [] [] [x]           Plan  Discharge from current PT POC.    Patient/Family/Caregiver was advised of these findings, precautions, and treatment options and has agreed to actively participate in planning and for this course of care.    Thank you for your referral. If you have any questions, please contact me at Dept: [phone].    Sincerely,  Electronically signed by therapist: Constance Hernandez, PT     Physician's certification required:  No  Please co-sign or sign and return this letter via fax as soon as possible to [phone].   I certify the need for these services furnished under this plan of treatment and while under my care.    X___________________________________________________ Date____________________    Certification From: 3/14/2025  To:6/12/2025     Treatment Last 4 Visits       2/26/2025 3/4/2025 3/7/2025 3/14/2025   PT Treatment   Treatment Day 4 5 6 7   Therapeutic Exercise Education: pacing self with walking  NuStep L2 X 6'  Cont assess: lumbar ROM, cont LE strength - guiding TX interventions (lumbar vs hip drivers of symptoms)  Manual PROM L hip flex, IR, ER to tolerance  Squat taps to high tableside  Hip hinge instruction      Therapeutic Exercise Min 40      Total of Timed Procedures 40      Total of Service Based 0      Total Treatment Time 40             2/26/2025 3/4/2025 3/7/2025 3/14/2025   Spine Treatment   Treatment Day 4 5 6 7   Therapeutic Exercise  NuStep L3 X 6'  Pt education: instructed to defer any imaging ?s to referring provider, as  well as reach out re: ongoing/ lingering symptoms  Supine hip abd carmencita black pilates ring 5\" 2 X 10  SB DKTC 5\" 2 X 15  SB LTR X 10 ea  SB bridge 2 X 10  Clams 2 X 15 L  Reverse clams 2 X 15 L  Squat taps to high tableside with hip hinge X 15  Manual PROM L hip flex, IR, ER to tolerance NuStep L3 X 7'  Supine alt LE march X 10 ea  Supine B Hip flex carmencita 5\" with BLE on SB 2 X 10  SB DKTC 5\" 2 X 15  Sit<>stand 2 X 10  Fwd step up 6\" with HHA X 15  Lat step up 4\" with HHA X 15 with instruction in how pt can complete with box/ step at the fitness center  Shuttle DL press 25#->31# X 20 with instruction in how to complete at fitness center- may need to reduce weight, stop if painful; ask     Next: Standing carmencita hip flex at wall (small ball) Assess/ catch up from visit with PA (referring): next steps; goal status completion  Discharge instructions  HEP review  NuStep L4 X 6'  Manual PROM L hip flex, IR, ER to tolerance     Therapeutic Exercise Minutes  40 43 42   Total Time Of Timed Procedures  40 43 42   Total Time Of Service-Based Procedures  0 0 0   Total Treatment Time  40 43 42   HEP   Access Code: [email or web address]  URL: [email or web address]  Date: 03/07/2025  Prepared by: Constance Hernandez    Exercises  - Bridge with Heels on Swiss Ball  - 1 x daily - 3 x weekly - 2 sets - 15 reps  - Supine Lower Trunk Rotation with Swiss Ball  - 1 x daily - 3 x weekly - 1 sets - 10-15 reps - 5 sec hold  - Supine Knees to Chest with Swiss Ball  - 1 x daily - 3 x weekly - 2 sets - 15 reps  - Sidelying Reverse Clamshell  - 1 x daily - 3 x weekly - 2 sets - 15 reps  - Clamshell  - 1 x daily - 3 x weekly - 2 sets - 15 reps  - Supine Bilateral Isometric Hip Flexion  - 1 x daily - 3 x weekly - 2 sets - 10 reps - 5 sec hold         HEP  Access Code: [email or web address]  URL: [email or web address]  Date: 03/07/2025  Prepared by: Constance Mary    Exercises  - Bridge with Heels on Swiss Ball  - 1 x daily - 3 x weekly - 2 sets  - 15 reps  - Supine Lower Trunk Rotation with Swiss Ball  - 1 x daily - 3 x weekly - 1 sets - 10-15 reps - 5 sec hold  - Supine Knees to Chest with Swiss Ball  - 1 x daily - 3 x weekly - 2 sets - 15 reps  - Sidelying Reverse Clamshell  - 1 x daily - 3 x weekly - 2 sets - 15 reps  - Clamshell  - 1 x daily - 3 x weekly - 2 sets - 15 reps  - Supine Bilateral Isometric Hip Flexion  - 1 x daily - 3 x weekly - 2 sets - 10 reps - 5 sec hold    Charges  3TE

## 2025-03-18 ENCOUNTER — HOSPITAL ENCOUNTER (OUTPATIENT)
Dept: MAMMOGRAPHY | Facility: HOSPITAL | Age: 75
Discharge: HOME | End: 2025-03-18
Attending: INTERNAL MEDICINE
Payer: MEDICARE

## 2025-03-18 DIAGNOSIS — Z12.31 ENCOUNTER FOR SCREENING MAMMOGRAM FOR MALIGNANT NEOPLASM OF BREAST: ICD-10-CM

## 2025-03-18 PROCEDURE — 77067 SCR MAMMO BI INCL CAD: CPT | Performed by: INTERNAL MEDICINE

## 2025-03-18 PROCEDURE — 77063 BREAST TOMOSYNTHESIS BI: CPT | Performed by: INTERNAL MEDICINE

## 2025-04-02 ENCOUNTER — PATIENT MESSAGE (OUTPATIENT)
Facility: CLINIC | Age: 75
End: 2025-04-02

## 2025-04-02 DIAGNOSIS — M16.12 PRIMARY OSTEOARTHRITIS OF LEFT HIP: Primary | ICD-10-CM

## 2025-04-02 NOTE — TELEPHONE ENCOUNTER
LOV 3/11/25    Pt stil with complaint of difficulty with lifting leg leg without pain.   Has been doing PT without improvement of this. Asking about advanced imaging.  Reviewed LOV notes. Mod-severe OA.      \"I 'm still having difficulty lifting my left leg with pain. This has been going on since the end of January 2025 and its not that I haven't given enough time. I have been faithfully doing stretching and strength exercising daily. I'm getting burned out on physical therapy. I really like to move on for additional testing. Would it be possible to order an Open MRI on the upper leg thigh and hip area? I appreciate your thoughts. \"

## 2025-04-16 ENCOUNTER — HOSPITAL ENCOUNTER (OUTPATIENT)
Dept: MRI IMAGING | Facility: HOSPITAL | Age: 75
Discharge: HOME | End: 2025-04-16
Attending: PHYSICIAN ASSISTANT
Payer: MEDICARE

## 2025-04-16 DIAGNOSIS — M16.12 PRIMARY OSTEOARTHRITIS OF LEFT HIP: ICD-10-CM

## 2025-04-16 PROCEDURE — 73721 MRI JNT OF LWR EXTRE W/O DYE: CPT | Performed by: PHYSICIAN ASSISTANT

## 2025-04-20 ENCOUNTER — PATIENT MESSAGE (OUTPATIENT)
Facility: CLINIC | Age: 75
End: 2025-04-20

## 2025-04-22 ENCOUNTER — OFFICE VISIT (OUTPATIENT)
Facility: CLINIC | Age: 75
End: 2025-04-22
Payer: MEDICARE

## 2025-04-22 DIAGNOSIS — M16.12 PRIMARY OSTEOARTHRITIS OF LEFT HIP: Primary | ICD-10-CM

## 2025-04-22 PROCEDURE — 1160F RVW MEDS BY RX/DR IN RCRD: CPT | Performed by: PHYSICIAN ASSISTANT

## 2025-04-22 PROCEDURE — 1159F MED LIST DOCD IN RCRD: CPT | Performed by: PHYSICIAN ASSISTANT

## 2025-04-22 PROCEDURE — 99214 OFFICE O/P EST MOD 30 MIN: CPT | Performed by: PHYSICIAN ASSISTANT

## 2025-04-22 RX ORDER — ACETAMINOPHEN 500 MG
500 TABLET ORAL EVERY 6 HOURS PRN
COMMUNITY

## 2025-04-22 NOTE — PROGRESS NOTES
[facility] Progress Note    Subjective: Patient presents to clinic to discuss her left hip.  She recently MRI scan of the left hip obtained and continues to experience pain in the left hip region.  She contemplates more now that she may need to undergo hip replacement surgery.  She wants to capitalize while she still is in good health and would like to be functional over the next few years.  She denies any significant pain in the right hip, occasionally just twinges of pain in the groin region.  She has tried using Tylenol arthritis without any noticeable improvement.    Objective: Patient is ambulating independently in clinic today.  Alert and oriented.  No acute distress.  Nonlabored breathing.  Grossly neurologically intact.    Imaging: MRI of the left hip personally viewed, independently interpreted and radiology report read.  MRI demonstrates expected degenerative changes with subchondral edema in the superior aspect of the acetabulum, complete loss of articular cartilage along the femoral head, moderate hip joint effusion.    Assessment/Plan: 74-year-old female with radiographically severe left hip osteoarthritis.  I discussed the results of the MRI in detail with the patient and we also thoroughly discussed surgical intervention.  She had questions about robotic hip replacement surgery, anterior versus posterior approach, lengthening of the leg intraoperatively, recovery timeline and these were answered to her satisfaction.  She does feel she is at the point where she would like to discuss surgical intervention with Dr. Kennedy, an appointment has been scheduled for her.  I also provided the patient with a hip replacement binder.  Please note that over 40 minutes was spent in face-to-face interaction with the patient explaining the results of her MRI scan and addressing her questions pertaining to surgical intervention.       Erick Anthony PA-C  [facility]    This note was  dictated using Dragon software.  While it was briefly proofread prior to completion, some grammatical, spelling, and word choice errors due to dictation may still occur.

## 2025-05-05 ENCOUNTER — MED REC SCAN ONLY (OUTPATIENT)
Dept: INTERNAL MEDICINE CLINIC | Facility: CLINIC | Age: 75
End: 2025-05-05

## 2025-05-13 ENCOUNTER — LAB ENCOUNTER (OUTPATIENT)
Dept: LAB | Age: 75
End: 2025-05-13
Attending: INTERNAL MEDICINE
Payer: MEDICARE

## 2025-05-13 DIAGNOSIS — E03.8 OTHER SPECIFIED HYPOTHYROIDISM: ICD-10-CM

## 2025-05-13 DIAGNOSIS — Z00.00 ENCOUNTER FOR MEDICARE ANNUAL WELLNESS EXAM: ICD-10-CM

## 2025-05-13 DIAGNOSIS — E78.00 PURE HYPERCHOLESTEROLEMIA: ICD-10-CM

## 2025-05-13 LAB
ALBUMIN SERPL-MCNC: 4.1 G/DL (ref 3.2–4.8)
ALBUMIN/GLOB SERPL: 1.8 {RATIO} (ref 1–2)
ALP LIVER SERPL-CCNC: 59 U/L (ref 55–142)
ALT SERPL-CCNC: 9 U/L (ref 10–49)
ANION GAP SERPL CALC-SCNC: 4 MMOL/L (ref 0–18)
AST SERPL-CCNC: 21 U/L (ref ?–34)
BASOPHILS # BLD AUTO: 0.03 X10(3) UL (ref 0–0.2)
BASOPHILS NFR BLD AUTO: 0.6 %
BILIRUB SERPL-MCNC: 0.6 MG/DL (ref 0.2–1.1)
BUN BLD-MCNC: 16 MG/DL (ref 9–23)
CALCIUM BLD-MCNC: 9.2 MG/DL (ref 8.7–10.6)
CHLORIDE SERPL-SCNC: 105 MMOL/L (ref 98–112)
CHOLEST SERPL-MCNC: 179 MG/DL (ref ?–200)
CO2 SERPL-SCNC: 32 MMOL/L (ref 21–32)
CREAT BLD-MCNC: 1.07 MG/DL (ref 0.55–1.02)
EGFRCR SERPLBLD CKD-EPI 2021: 54 ML/MIN/1.73M2 (ref 60–?)
EOSINOPHIL # BLD AUTO: 0.11 X10(3) UL (ref 0–0.7)
EOSINOPHIL NFR BLD AUTO: 2.3 %
ERYTHROCYTE [DISTWIDTH] IN BLOOD BY AUTOMATED COUNT: 12.1 %
FASTING PATIENT LIPID ANSWER: YES
FASTING STATUS PATIENT QL REPORTED: YES
GLOBULIN PLAS-MCNC: 2.3 G/DL (ref 2–3.5)
GLUCOSE BLD-MCNC: 89 MG/DL (ref 70–99)
HCT VFR BLD AUTO: 46.7 % (ref 35–48)
HDLC SERPL-MCNC: 63 MG/DL (ref 40–59)
HGB BLD-MCNC: 14.9 G/DL (ref 12–16)
IMM GRANULOCYTES # BLD AUTO: 0.01 X10(3) UL (ref 0–1)
IMM GRANULOCYTES NFR BLD: 0.2 %
LDLC SERPL CALC-MCNC: 104 MG/DL (ref ?–100)
LYMPHOCYTES # BLD AUTO: 0.94 X10(3) UL (ref 1–4)
LYMPHOCYTES NFR BLD AUTO: 19.6 %
MCH RBC QN AUTO: 30 PG (ref 26–34)
MCHC RBC AUTO-ENTMCNC: 31.9 G/DL (ref 31–37)
MCV RBC AUTO: 94 FL (ref 80–100)
MONOCYTES # BLD AUTO: 0.62 X10(3) UL (ref 0.1–1)
MONOCYTES NFR BLD AUTO: 12.9 %
NEUTROPHILS # BLD AUTO: 3.09 X10 (3) UL (ref 1.5–7.7)
NEUTROPHILS # BLD AUTO: 3.09 X10(3) UL (ref 1.5–7.7)
NEUTROPHILS NFR BLD AUTO: 64.4 %
NONHDLC SERPL-MCNC: 116 MG/DL (ref ?–130)
OSMOLALITY SERPL CALC.SUM OF ELEC: 293 MOSM/KG (ref 275–295)
PLATELET # BLD AUTO: 193 10(3)UL (ref 150–450)
POTASSIUM SERPL-SCNC: 4.1 MMOL/L (ref 3.5–5.1)
PROT SERPL-MCNC: 6.4 G/DL (ref 5.7–8.2)
RBC # BLD AUTO: 4.97 X10(6)UL (ref 3.8–5.3)
SODIUM SERPL-SCNC: 141 MMOL/L (ref 136–145)
TRIGL SERPL-MCNC: 61 MG/DL (ref 30–149)
TSI SER-ACNC: 2.63 UIU/ML (ref 0.55–4.78)
VLDLC SERPL CALC-MCNC: 10 MG/DL (ref 0–30)
WBC # BLD AUTO: 4.8 X10(3) UL (ref 4–11)

## 2025-05-13 PROCEDURE — 80053 COMPREHEN METABOLIC PANEL: CPT

## 2025-05-13 PROCEDURE — 85025 COMPLETE CBC W/AUTO DIFF WBC: CPT

## 2025-05-13 PROCEDURE — 36415 COLL VENOUS BLD VENIPUNCTURE: CPT

## 2025-05-13 PROCEDURE — 84443 ASSAY THYROID STIM HORMONE: CPT

## 2025-05-13 PROCEDURE — 80061 LIPID PANEL: CPT

## 2025-05-16 ENCOUNTER — TELEPHONE (OUTPATIENT)
Facility: CLINIC | Age: 75
End: 2025-05-16

## 2025-05-16 ENCOUNTER — OFFICE VISIT (OUTPATIENT)
Dept: ORTHOPEDICS CLINIC | Facility: CLINIC | Age: 75
End: 2025-05-16
Payer: MEDICARE

## 2025-05-16 VITALS — WEIGHT: 168.63 LBS | BODY MASS INDEX: 29.88 KG/M2 | HEIGHT: 63 IN

## 2025-05-16 DIAGNOSIS — R29.898 WEAKNESS OF LEFT LOWER EXTREMITY: ICD-10-CM

## 2025-05-16 DIAGNOSIS — M16.12 PRIMARY OSTEOARTHRITIS OF LEFT HIP: Primary | ICD-10-CM

## 2025-05-16 PROCEDURE — 3008F BODY MASS INDEX DOCD: CPT | Performed by: ORTHOPAEDIC SURGERY

## 2025-05-16 PROCEDURE — 99213 OFFICE O/P EST LOW 20 MIN: CPT | Performed by: ORTHOPAEDIC SURGERY

## 2025-05-16 PROCEDURE — 1160F RVW MEDS BY RX/DR IN RCRD: CPT | Performed by: ORTHOPAEDIC SURGERY

## 2025-05-16 PROCEDURE — 1159F MED LIST DOCD IN RCRD: CPT | Performed by: ORTHOPAEDIC SURGERY

## 2025-05-16 NOTE — H&P
[facility] New Patient Note    CC:   Chief Complaint   Patient presents with    Other     Left hip surgical discussion        HPI: This 74 year old female presents today with complaints of left thigh/lower extremity weakness, as well as pain.  She reports her most bothersome symptoms are the weakness about the left lower extremity which she feels is progressing.  She notes more difficulty lifting the leg to get in and out of vehicle, going up and down stairs.  She feels this has been getting progressively worse.  She does state there is pain as well, pain in the groin as well as pain into the buttock.  She notes that she has been diagnosed both with hip arthritis as well as spine degenerative arthritis, but does not follow with anybody for the back.  She has not done any treatments for the hip or back in the recent past.  She denies any radiation of pain or numbness tingling.    Past Medical History[1]  Past Surgical History[2]  Current Medications[3]  Allergies[4]  Family History[5]  Social History     Occupational History    Not on file   Tobacco Use    Smoking status: Never    Smokeless tobacco: Never    Tobacco comments:     Updated 3/11/25   Vaping Use    Vaping status: Never Used   Substance and Sexual Activity    Alcohol use: Not Currently    Drug use: Never    Sexual activity: Not on file        ROS:  Detailed system review obtained and negative except as mentioned above      Physical Exam:    LMP  (LMP Unknown)   Constitutional: Awake, alert, no distress.   Psychological: Appropriate affect.  Respiratory: Unlabored breathing.  Left lower extremity:  Inspection: skin intact, thickened soft tissue envelope about thigh  Palpation: No tenderness about the proximal lateral thigh  Range of motion: 90 with external rotation 35 is mildly uncomfortable, internal rotation 10-15 is mildly uncomfortable  [facility] negative  Neuromuscular: 5 hip flexion strength on the left compared to 5 out of 5 on the right,  sensation intact  Vascular: Warm well-perfused      Imaging: X-rays of the left hip and pelvis personally viewed, independently interpreted and radiology report read.  Demonstrates moderate to severe osteoarthritis of the left hip with near complete loss of joint space, subchondral sclerosis and large osteophytes.  Kellgren-Sean grade 3.  Notable there are similar degenerative changes at the right hip.      Assessment/Plan:  Diagnoses and all orders for this visit:    Primary osteoarthritis of left hip      Assessment: 74-year-old female with main complaint of left lower extremity weakness, also with pain and radiographic moderate to severe osteoarthritis of the left hip (similar to the right hip)    Plan: We discussed potential etiologies for her symptoms.  Likely has some degree of pain secondary to her hip arthritis, however did discuss that the primary indication for treating hip arthritis including with hip replacement is for pain from arthritis.  Complaint of weakness may or may not be secondary to the hip.  We did discuss what weakness may include, such as inhibition from pain which may be secondary to hip arthritis, or weakness from deconditioning, weakness from spine etiology or pinched nerves.  Counseled that this may or may not be something that improves with a hip replacement surgery.  We also discussed that despite her symptoms on the left hip, used the right hip for example of asymptomatic arthritis as she has similar degenerative changes in the hip on that side but no symptoms.  She did have a number of questions today which were answered.  Recommendation was for consideration of intra-articular hip injection, not only for potential therapeutic purposes but also diagnostic purposes.  If she does not get relief of her main complaint from hip injection, which is weakness of the lower extremity, would recommend evaluation for her spine prior to consideration of hip replacement.  She expressed  understanding and agreement.    Jason Kennedy MD, FAAOS  Merged with [facility]  Phone [phone]  Fax [phone]         [1]   Past Medical History:   Arthritis    Osteoarthritis Back & Hip    Back pain    Basal cell carcinoma    Blood in the stool    2024 (Visited ER)    Diverticulitis    Dizziness    Heart palpitations    Bradycardia    Hemorrhoids    2024 (Visited ER)    History of cardiac murmur    Hypothyroidism    Osteoarthritis    HIp,  Lumbar    Wears glasses   [2]   Past Surgical History:  Procedure Laterality Date    Colonoscopy  2006;2010    Diverticulosis    Jennifer biopsy stereo nodule 1 site left (cpt=19081) Left 2024    fibroadenomatous changes      Not applicable    Not applicable    Other surgical history  Dec/1991    thyroidectomy    Skin surgery  12/23/15    Basil cell carcinoma-face    Thyroidectomy      Thyroidectomy     [3]   Current Outpatient Medications   Medication Sig Dispense Refill    acetaminophen 500 MG Oral Tab Take 1 tablet (500 mg total) by mouth every 6 (six) hours as needed for Pain.      SYNTHROID 100 MCG Oral Tab Take 1 tablet (100 mcg total) by mouth before breakfast. 90 tablet 3    Multiple Vitamins-Minerals (MULTI-VITAMIN/MINERALS) Oral Tab Take 1 tablet by mouth daily.      CALCIUM OR Take by mouth.      Calcium Carbonate (CALCIUM HIGH POTENCY) 1500 (600 Ca) MG Oral Tab calcium 600 mg (as calcium carbonate 1,500 mg) tablet, [RxNorm: 283120]     [4]   Allergies  Allergen Reactions    Penicillins ITCHING     As child     [5]   Family History  Problem Relation Age of Onset    Other (Other) Father         cirrohosis    Stroke Mother     Other (Other) Mother         pneumonia    Colon Polyps Paternal Grandfather     Colon Polyps Brother     Hypertension Brother         high blood pressure    Heart Disorder Brother         Afib; sleep apnea    Lipids Brother         Cholesterol    Musculo-skelatal Disorder Brother         Lower Back/Disc     Colon Polyps Brother

## 2025-05-16 NOTE — TELEPHONE ENCOUNTER
New pt appt for left hip injection  Future Appointments   Date Time Provider Department Center   5/27/2025  8:40 AM Shen Moctezuma DO [facility] [address]   7/11/2025  9:00 AM Suzanne Mancia MD [facility] [address] [facility]   7/14/2025  8:45 AM PATRICIO DALLAS [facility] None

## 2025-06-25 DIAGNOSIS — Z90.89 S/P TOTAL THYROIDECTOMY: ICD-10-CM

## 2025-06-25 DIAGNOSIS — Z98.890 S/P TOTAL THYROIDECTOMY: ICD-10-CM

## 2025-06-25 DIAGNOSIS — E03.8 OTHER SPECIFIED HYPOTHYROIDISM: ICD-10-CM

## 2025-06-25 RX ORDER — LEVOTHYROXINE SODIUM 100 MCG
100 TABLET ORAL
Qty: 90 TABLET | Refills: 3 | Status: SHIPPED | OUTPATIENT
Start: 2025-06-25

## 2025-06-25 NOTE — TELEPHONE ENCOUNTER
Refill Per Protocol     Requested Prescriptions   Pending Prescriptions Disp Refills    SYNTHROID 100 MCG Oral Tab [Pharmacy Med Name: Synthroid 100 Mcg Tab Abbv] 90 tablet 0     Sig: TAKE ONE TABLET BY MOUTH BEFORE BREAKFAST       Thyroid Medication Protocol Passed - 6/25/2025 11:12 AM        Passed - TSH in past 12 months        Passed - Last TSH value is normal     Lab Results   Component Value Date    TSH 2.633 05/13/2025                 Passed - In person appointment or virtual visit in the past 12 mos or appointment in next 3 mos     Recent Outpatient Visits              1 month ago Primary osteoarthritis of left hip    [facility], [address] [place] [address] Jason Kennedy MD    Office Visit    2 months ago Primary osteoarthritis of left hip    [facility], [address] [place] [address] Erick Anthony PA-C    Office Visit    3 months ago     [facility] in [place] Constance Hernandez, PT    Office Visit    3 months ago Primary osteoarthritis of left hip    [facility], [address] [place] [address] Erick Anthony PA-C    Office Visit    3 months ago     [facility] in [place] Constance Hernandez, PT    Office Visit          Future Appointments         Provider Department Appt Notes    In 2 weeks Suzanne Mancia MD [facility], [address] [place] [address] Last Cpe 7/9/24. .    In 2 weeks BURT, PROCEDURE SGI Edward 2/24/25 Per OV Colon EH 7/14/25 Dr. Thurston. Latex allergy.[email or web address]                    Passed - Medication is active on med list

## 2025-07-02 RX ORDER — METOPROLOL SUCCINATE 25 MG/1
25 TABLET, EXTENDED RELEASE ORAL DAILY
COMMUNITY
Start: 2025-06-03

## 2025-07-09 ENCOUNTER — PATIENT MESSAGE (OUTPATIENT)
Dept: INTERNAL MEDICINE CLINIC | Facility: CLINIC | Age: 75
End: 2025-07-09

## 2025-07-09 NOTE — TELEPHONE ENCOUNTER
Patient is set and scheduled on below for new appt time for Friday  Future Appointments   Date Time Provider Department Center   7/11/2025  9:20 AM Suzanne Mancia MD [facility] [address] [facility]   7/14/2025  8:15 AM PATRICIO DALLAS [facility] None

## 2025-07-11 ENCOUNTER — OFFICE VISIT (OUTPATIENT)
Dept: INTERNAL MEDICINE CLINIC | Facility: CLINIC | Age: 75
End: 2025-07-11
Payer: MEDICARE

## 2025-07-11 VITALS
HEART RATE: 56 BPM | TEMPERATURE: 99 F | HEIGHT: 62.6 IN | OXYGEN SATURATION: 98 % | BODY MASS INDEX: 30.26 KG/M2 | WEIGHT: 168.63 LBS | RESPIRATION RATE: 18 BRPM | SYSTOLIC BLOOD PRESSURE: 138 MMHG | DIASTOLIC BLOOD PRESSURE: 70 MMHG

## 2025-07-11 DIAGNOSIS — M12.9 ARTHRITIS INVOLVING MULTIPLE SITES: ICD-10-CM

## 2025-07-11 DIAGNOSIS — N18.31 STAGE 3A CHRONIC KIDNEY DISEASE (HCC): ICD-10-CM

## 2025-07-11 DIAGNOSIS — Z85.828 HISTORY OF BASAL CELL CARCINOMA OF SKIN: ICD-10-CM

## 2025-07-11 DIAGNOSIS — Z00.00 ENCOUNTER FOR MEDICARE ANNUAL WELLNESS EXAM: Primary | ICD-10-CM

## 2025-07-11 DIAGNOSIS — E03.8 OTHER SPECIFIED HYPOTHYROIDISM: ICD-10-CM

## 2025-07-11 DIAGNOSIS — E78.9 BORDERLINE HIGH CHOLESTEROL: ICD-10-CM

## 2025-07-11 DIAGNOSIS — M16.0 PRIMARY OSTEOARTHRITIS OF BOTH HIPS: ICD-10-CM

## 2025-07-11 DIAGNOSIS — Z87.19 HISTORY OF RECTAL BLEEDING: ICD-10-CM

## 2025-07-11 DIAGNOSIS — R00.2 PALPITATIONS: ICD-10-CM

## 2025-07-11 NOTE — PROGRESS NOTES
The following individual(s) verbally consented to be recorded using ambient AI listening technology and understand that they can each withdraw their consent to this listening technology at any point by asking the clinician to turn off or pause the recording:    Patient name: Magda Isabel    Subjective:   Magda Isabel is a 74 year old female who presents for a MA AHA (Medicare Advantage Annual Health Assessment) and Medicare Subsequent Annual Wellness visit (Pt already had Initial Annual Wellness) and scheduled follow up of multiple significant but stable problems.   History of Present Illness  Magda Isabel is a 74 year old pleasant patient here for wellness and follow up-  Rectal bleeding was last Sept, felt to be hemorrhoid related. No recent issues but she is scheduled for a colonoscopy next week.  She has had mild CKD, discussed pushing water and avoiding nsaids. She occasionally takes tylenol for arthritis pain.   She has been taking metoprolol for about a month due to heart rhythm irregularities identified after a 7-day heart monitor. Her heart rate has decreased from the 70s to the high 50s since starting the medication. Tolerating metoprolol well, will f/u with cardiology   She also takes Synthroid for hypothyroidism. TSH is normal. Energy is ok. Weight slightly up because less active this year  She has a history of shingles and has declined the shingles vaccine.  Arthritis in her hips affects her mobility and limits walking. Exercises have been beneficial for pain and weakness in her left leg, attributed to arthritis. She manages pain with Tylenol occasionally and has not taken Advil recently. She follows with Dr. Kennedy  A history of skin cancer was diagnosed around the age of 64, and she continues regular check-ups. She is not a sun worshiper anymore and has not noticed any suspicious skin changes recently.  She has a history of high cholesterol, it is her HDL that is mainly high (HDL  63, ).    History/Other:   Fall Risk Assessment:   She has been screened for Falls and is low risk.      Cognitive Assessment:   She had a completely normal cognitive assessment - see flowsheet entries     Functional Ability/Status:   Magda Isabel has a completely normal functional assessment. See flowsheet for details.      Depression Screening (PHQ):  PHQ-2 SCORE: 0  , done 2025            Advanced Directives:   She does NOT have a Living Will. [Do you have a living will?: (Patient-Rptd) No]  She does NOT have a Power of  for Health Care. [Do you have a healthcare power of ?: (Patient-Rptd) No]  Not discussed      Problem List[1]  Allergies:  She is allergic to adhesive tape and penicillins.    Current Medications:  Active Meds, Sig Only[2]    Medical History:  She  has a past medical history of Arrhythmia, Arthritis (), Back pain (), Basal cell carcinoma, Blood in the stool (20), Disorder of thyroid, Diverticulitis, Dizziness (24 (Visited ER)), Heart palpitations (24 (Visited ER)), Hemorrhoid, Hemorrhoids (2010), History of cardiac murmur (?), motion sickness, Hypothyroidism, Osteoarthritis (), Visual impairment, and Wears glasses.  Surgical History:  She  has a past surgical history that includes thyroidectomy; thyroidectomy; colonoscopy (2006;2010); wilfred biopsy stereo nodule 1 site left (cpt=19081) (Left, 2024);  (Not applicable); skin surgery (12/23/15); and other surgical history (Dec/1991).   Family History:  Her family history includes Colon Polyps in her brother, brother, and paternal grandfather; Heart Disorder in her brother; Hypertension in her brother; Lipids in her brother; Musculo-skelatal Disorder in her brother; Other in her father and mother; Stroke in her mother.  Social History:  She  reports that she has never smoked. She has never used smokeless tobacco. She reports that she does not currently use  alcohol. She reports that she does not use drugs.    Tobacco:  She has never smoked tobacco.    CAGE Alcohol Screen:   CAGE screening score of 0 on 7/10/2025, showing low risk of alcohol abuse.      Patient Care Team:  Suzanne Mancia MD as PCP - General (Internal Medicine)  Naeem Nieto, PT as Physical Therapist  Constance Hernandez PT as Physical Therapist  Dylon, Generic Provider  Tanya Morales APRN as Registered Nurse (Nurse Practitioner Family)  Chirag Thurston MD (GASTROENTEROLOGY)  Maurice Fung DO (Cardiovascular Diseases)    Review of Systems     Negative for f/c/CP/palp  Chronic hip and leg pain r/t arthritis    Objective:   Physical Exam  Gen: NAD, appears stated age  HEENT: PERRL, EOMI, OP-clear, TMs- WNL  NECK: supple, no thyromegaly or JVD  CV: Regular, nl S1 S2  RESP: Clear to auscultation bilaterally  ABD: soft, NT, ND, +BS  EXT: no clubbing, cyanosis, or edema  NEURO: Alert and oriented, no focal deficits      /70 (BP Location: Left arm, Patient Position: Sitting, Cuff Size: large)   Pulse 56   Temp 98.6 °F (37 °C) (Temporal)   Resp 18   Ht 5' 2.6\" (1.59 m)   Wt 168 lb 9.6 oz (76.5 kg)   LMP  (LMP Unknown)   SpO2 98%   BMI 30.25 kg/m²  Estimated body mass index is 30.25 kg/m² as calculated from the following:    Height as of this encounter: 5' 2.6\" (1.59 m).    Weight as of this encounter: 168 lb 9.6 oz (76.5 kg).    Medicare Hearing Assessment:   Hearing Screening    Time taken: 7/11/2025  9:41 AM  Entry User: Marichuy Boles MA  Screening Method: Finger Rub  Finger Rub Result: Pass         Visual Acuity:   Right Eye Visual Acuity: Corrected Right Eye Chart Acuity: 20/25   Left Eye Visual Acuity: Corrected Left Eye Chart Acuity: 20/25   Both Eyes Visual Acuity: Corrected Both Eyes Chart Acuity: 20/20   Able To Tolerate Visual Acuity: Yes        Assessment & Plan:   Magda Isabel is a 74 year old female who presents for a Medicare Assessment.     1. Encounter for Medicare  annual wellness exam (Primary)- she is up to date on mammogram and dexa scan, she has colonoscopy scheduled for next week with [facility]  She deferred shingrix and declined covid 19 vaccine  2. Stage 3a chronic kidney disease (HCC)- stable, push water. Will monitor renal panel  -     Basic Metabolic Panel (8); Future; Expected date: 10/11/2025  3. Other specified hypothyroidism- stable, CPM  4. Borderline high cholesterol- HDL is high, overall ratio is good. Will monitor yearly  5. History of basal cell carcinoma of skin- she follows regularly with dermatology  6. History of rectal bleeding- resolved, she has colonoscopy next week  7. Palpitations- on metoprolol from cardiology, doing well. No acute cardiac complaints  8. Primary osteoarthritis of both hips- she is doing exercises given by Dr. Kennedy which are helping  9. Arthritis involving multiple sites- continue otc tylenol prn    Assessment & Plan             The patient indicates understanding of these issues and agrees to the plan.  Continue with current treatment plan.  Reinforced healthy diet, lifestyle, and exercise.      Return in about 3 months (around 10/11/2025), or if symptoms worsen or fail to improve, for follow up chronic issues.     Suzanne Mancia MD, 7/11/2025     Supplementary Documentation:   General Health:  In the past six months, have you lost more than 10 pounds without trying?: (Patient-Rptd) 2 - No  Has your appetite been poor?: (Patient-Rptd) No  Type of Diet: (Patient-Rptd) Balanced  How does the patient maintain a good energy level?: (Patient-Rptd) Appropriate Exercise, Daily Walks, Stretching  How would you describe your daily physical activity?: (Patient-Rptd) Moderate  How would you describe your current health state?: (Patient-Rptd) Good  How do you maintain positive mental well-being?: (Patient-Rptd) Puzzles, Visiting Friends, Visiting Family  On a scale of 0 to 10, with 0 being no pain and 10 being severe pain, what is your pain  level?: (Patient-Rptd) 4 - (Moderate)  In the past six months, have you experienced urine leakage?: (Patient-Rptd) 0-No  At any time do you feel concerned for the safety/well-being of yourself and/or your children, in your home or elsewhere?: (Patient-Rptd) No  Have you had any immunizations at another office such as Influenza, Hepatitis B, Tetanus, or Pneumococcal?: (Patient-Rptd) No    Health Maintenance   Topic Date Due    Zoster Vaccines (1 of 2) Never done    COVID-19 Vaccine (6 - 2024-25 season) 09/01/2024    Annual Well Visit  01/01/2025    Annual Depression Screening  01/01/2025    Colorectal Cancer Screening  09/02/2025    Influenza Vaccine (1) 10/01/2025    DEXA Scan  Completed    Fall Risk Screening (Annual)  Completed    Pneumococcal Vaccine: 50+ Years  Completed    Meningococcal B Vaccine  Aged Out    Mammogram  Discontinued            [1]   Patient Active Problem List  Diagnosis    Other specified hypothyroidism    Borderline high cholesterol    History of basal cell carcinoma of skin    S/P total thyroidectomy    Low back pain with radiation    Numbness and tingling in left hand    Family history of diabetes mellitus    Arthritis involving multiple sites    Stage 3a chronic kidney disease (HCC)    Rectal bleeding    Personal history of colonic polyps    Renal insufficiency    History of rectal bleeding    Palpitations   [2]   Outpatient Medications Marked as Taking for the 7/11/25 encounter (Office Visit) with Suzanne Mancia MD   Medication Sig    metoprolol succinate ER 25 MG Oral Tablet 24 Hr Take 1 tablet (25 mg total) by mouth daily.    SYNTHROID 100 MCG Oral Tab Take 1 tablet (100 mcg total) by mouth before breakfast.    acetaminophen 500 MG Oral Tab Take 1 tablet (500 mg total) by mouth every 6 (six) hours as needed for Pain.    Multiple Vitamins-Minerals (MULTI-VITAMIN/MINERALS) Oral Tab Take 1 tablet by mouth in the morning.    CALCIUM OR Take by mouth.

## 2025-07-13 ENCOUNTER — ANESTHESIA EVENT (OUTPATIENT)
Dept: ENDOSCOPY | Facility: HOSPITAL | Age: 75
End: 2025-07-13
Payer: MEDICARE

## 2025-07-13 NOTE — ANESTHESIA PREPROCEDURE EVALUATION
PRE-OP EVALUATION    Patient Name: Magda Isabel    Admit Diagnosis: Hematochezia [K92.1]    Pre-op Diagnosis: Hematochezia [K92.1]    COLONOSCOPY    Anesthesia Procedure: COLONOSCOPY    Surgeons and Role:     * Chirag Thurston MD - Primary    Pre-op vitals reviewed.  Temp: 98 °F (36.7 °C)  Pulse: 67  Resp: 10  BP: 163/61  SpO2: 100 %  Body mass index is 29.41 kg/m².    Current medications reviewed.  Hospital Medications:  Current Medications[1]    Outpatient Medications:   Prescriptions Prior to Admission[2]    Allergies: Adhesive tape and Penicillins      Anesthesia Evaluation    Patient summary reviewed.    Anesthetic Complications  (-) history of anesthetic complications         GI/Hepatic/Renal             (+) chronic renal disease                    Cardiovascular  Comment: Recently started metoprolol to address palpitations.    Per May 2025 Cardiology Visit:  Assessment  - Palpitations-s/p Holter monitor 12/27/2024 noting sinus tachycardia, 19 SVT runs, NSVT  - Worsening fatigue  - Hx thyroid nodule s/p removalPlan  - BP better at this visit.  - Recommended starting low-dose beta-blocker, however patient would like to hold off starting medications. Holter monitor reviewed noting a minimum heart rate of 39 bpm, max 115 bpm. 19 SVT runs. 1 run of NSVT. Repeat MCT 7-day monitor in the next 3 months.  - Recommend follow-up thyroid function and management with primary.  - Exercise stress echo reviewed with normal function.  - Follow-up in 3 months            Exercise tolerance: good     MET: >4                             (-) angina     (-) ROGER         Endo/Other           (+) hypothyroidism                       Pulmonary      (-) asthma         (-) shortness of breath     (-) sleep apnea       Neuro/Psych                                      Past Surgical History[3]  Social Hx on file[4]  History   Drug Use Unknown     Available pre-op labs reviewed.  Lab Results   Component Value Date    WBC 4.8  2025    RBC 4.97 2025    HGB 14.9 2025    HCT 46.7 2025    MCV 94.0 2025    MCH 30.0 2025    MCHC 31.9 2025    RDW 12.1 2025    .0 2025     Lab Results   Component Value Date     2025    K 4.1 2025     2025    CO2 32.0 2025    BUN 16 2025    CREATSERUM 1.07 (H) 2025    GLU 89 2025    CA 9.2 2025            Airway      Mallampati: III  Mouth opening: >3 FB  TM distance: 4 - 6 cm  Neck ROM: full Cardiovascular    Cardiovascular exam normal.  Rhythm: regular  Rate: normal     Dental    Dentition appears grossly intact         Pulmonary    Pulmonary exam normal.  Breath sounds clear to auscultation bilaterally.               Other findings              ASA: 2   Plan: MAC  NPO status verified and patient meets guidelines.  Patient has taken beta blockers in last 24 hours.  Post-procedure pain management plan discussed with surgeon and patient.      Plan/risks discussed with: patient                Present on Admission:  **None**               [1]    lactated ringers infusion   Intravenous Continuous   [2]   Medications Prior to Admission   Medication Sig Dispense Refill Last Dose/Taking    metoprolol succinate ER 25 MG Oral Tablet 24 Hr Take 1 tablet (25 mg total) by mouth daily.   2025 at  5:00 AM    SYNTHROID 100 MCG Oral Tab Take 1 tablet (100 mcg total) by mouth before breakfast. 90 tablet 3 2025    acetaminophen 500 MG Oral Tab Take 1 tablet (500 mg total) by mouth every 6 (six) hours as needed for Pain.   Past Week    Multiple Vitamins-Minerals (MULTI-VITAMIN/MINERALS) Oral Tab Take 1 tablet by mouth in the morning.   Past Week    CALCIUM OR Take by mouth.   Past Week   [3]   Past Surgical History:  Procedure Laterality Date    Colonoscopy  2006;2010    Diverticulosis    Jennifer biopsy stereo nodule 1 site left (cpt=19081) Left 2024    fibroadenomatous changes      Not applicable     Not applicable    Other surgical history  Dec/1991    thyroidectomy    Skin surgery  12/23/15    Basil cell carcinoma-face    Thyroidectomy      Thyroidectomy     [4]   Social History  Socioeconomic History    Marital status:    Tobacco Use    Smoking status: Never    Smokeless tobacco: Never    Tobacco comments:     Updated 3/11/25   Vaping Use    Vaping status: Never Used   Substance and Sexual Activity    Alcohol use: Not Currently    Drug use: Never

## 2025-07-14 ENCOUNTER — HOSPITAL ENCOUNTER (OUTPATIENT)
Facility: HOSPITAL | Age: 75
Setting detail: HOSPITAL OUTPATIENT SURGERY
Discharge: HOME | End: 2025-07-14
Attending: INTERNAL MEDICINE | Admitting: INTERNAL MEDICINE
Payer: MEDICARE

## 2025-07-14 ENCOUNTER — ANESTHESIA (OUTPATIENT)
Dept: ENDOSCOPY | Facility: HOSPITAL | Age: 75
End: 2025-07-14
Payer: MEDICARE

## 2025-07-14 VITALS
DIASTOLIC BLOOD PRESSURE: 68 MMHG | TEMPERATURE: 98 F | RESPIRATION RATE: 18 BRPM | HEIGHT: 63 IN | WEIGHT: 166 LBS | BODY MASS INDEX: 29.41 KG/M2 | OXYGEN SATURATION: 100 % | HEART RATE: 54 BPM | SYSTOLIC BLOOD PRESSURE: 116 MMHG

## 2025-07-14 DIAGNOSIS — K92.1 HEMATOCHEZIA: ICD-10-CM

## 2025-07-14 PROCEDURE — 88305 TISSUE EXAM BY PATHOLOGIST: CPT | Performed by: INTERNAL MEDICINE

## 2025-07-14 RX ORDER — SODIUM CHLORIDE, SODIUM LACTATE, POTASSIUM CHLORIDE, CALCIUM CHLORIDE 600; 310; 30; 20 MG/100ML; MG/100ML; MG/100ML; MG/100ML
INJECTION, SOLUTION INTRAVENOUS CONTINUOUS
Status: DISCONTINUED | OUTPATIENT
Start: 2025-07-14 | End: 2025-07-14

## 2025-07-14 RX ORDER — ONDANSETRON 2 MG/ML
4 INJECTION INTRAMUSCULAR; INTRAVENOUS ONCE AS NEEDED
Status: DISCONTINUED | OUTPATIENT
Start: 2025-07-14 | End: 2025-07-14

## 2025-07-14 RX ORDER — NALOXONE HYDROCHLORIDE 0.4 MG/ML
0.08 INJECTION, SOLUTION INTRAMUSCULAR; INTRAVENOUS; SUBCUTANEOUS ONCE AS NEEDED
Status: DISCONTINUED | OUTPATIENT
Start: 2025-07-14 | End: 2025-07-14

## 2025-07-14 NOTE — OPERATIVE REPORT
Colon operative report  Patient Name: Magda Isabel  Procedure: Colonoscopy with snare polypectomy  Date of procedure: 7/14/2025    Pre-operative Indication: Hematochezia  Post-operative findings: Internal hemorrhoids, Diverticulosis, colon polyps  Date of last colonoscopy: N/A - the current procedure was performed for acute illness  Attending: Chirag Thurston M.D.  Consent: The risks, benefits, and alternatives were discussed with the patient / POA.  Risks included, but were not limited to, bleeding, perforation, medication effects, cardiac arrhythmias, missed polyps, and aspiration.  After all questions were answered to their satisfaction, a signed, informed, and witnessed consent was obtained.  Timeout:  Prior to initiation of sedation, a formal timeout was performed, confirming the patient's name, date of birth, allergies, correct procedure, and need for antibiotics.  The operating physician and sedating physician was also confirmed prior to initiation of sedation.     Sedation: Monitored Anesthesia Care  Monitoring: Pulsoximetry, pulse, respirations, and blood pressure were monitored throughout the entire procedure    Preparation Quality: Adequate           Sussex Prep Score:  Right: 2, Middle: 3, Left: 3    Total: 8  Procedure: After achieving adequate sedation, and placing the patient in the left lateral decubitus position, a digital rectal examination was performed.  The lubricated tip of the pediatric colonoscope was then introduced into the rectum and advanced to the terminal ileum.  The appendiceal orifice and ileocecal valve were clearly and distinctly visualized, thus verifying the cecum.  The terminal ileum was intubated and found to be normal to the extent examined.  The endoscope was then carefully withdrawn from the patient with careful visualization of the colonic mucosa revealing no additional pathologic findings.  Air was suctioned to the best of my ability, during withdrawal of the  endoscope.  When the endoscope reached the rectum, it was placed in a retroflexed position, and the rectal bulb was thus visualized.  The endoscope was righted, and air was suctioned from the colon to the best of my ability, as it was during withdrawn from the colon.  The endoscope was then removed from the patient.  The patient tolerated the procedure without apparent procedural complications.  The patient left the procedure room in stable condition for recovery.  Findings:  There were grade II internal hemorrhoids.  There were no masses, fissures, fistulae, or external hemorrhoids.  The mucosa of the colon  was normal, from the rectum to the cecum.  There were no masses, ulcers, or erosions.  An occasional sigmoid diverticulum was appreciated.  In the proximal transverse colon, at the hepatic flexure, 2 sessile polyps (3 mm / 6mm; Nice II) were resected by cold snare, using the standard hexagonal snare.  In the sigmoid colon, a 3 mm sessile polyp (Nice I) was resected by cold snare, using the standard hexagonal snare.  The appendiceal orifice and ileocecal valve were both clearly and distinctly visualized, thus verifying the cecum.  The terminal ileum was intubated and the terminal ileal mucosa was found to be normal to the extent examined.   Impression: Findings as above are consistent with likely hemorrhoidal bleeding.  Recommendations:   1) Await pathology  2) Benefiber 1 TBSP in 8 oz water : 1-2 times/day  Repeat Colonoscopy Indicated: 7 years.  The recall interval may change based on the final pathology results.

## 2025-07-14 NOTE — H&P
[facility]  History & Physical    Magda Isabel Patient Status:  Hospital Outpatient Surgery    1950 MRN [id number]   Location [facility] Attending Chirag Thurston MD   Hosp Day # 0 PCP Suzanne Mancia MD     History of Present Illness:  Magda Isabel is a(n) 74 year old female with hematochezia presenting for diagnostic colonoscopy.    History:  Past Medical History[1]  Past Surgical History[2]  Family History[3]   reports that she has never smoked. She has never used smokeless tobacco. She reports that she does not currently use alcohol. She reports that she does not use drugs.  Allergies[4]    Current Medications[5]  Medications - Current[6]    Review of Systems:  Gastrointestinal: Denies positive test for blood stool, heartburn/indigestion/reflux, belching, difficulty swallowing, irregular bowel habits, painful swallowing, diarrhea, abdominal pain, constipation, nausea, incontinence of stool, vomiting, black stools, get full quickly at meals, blood in stools, abdominal distention, jaundice, flatulence, vomiting blood, bloating, hernia, laxative use, food/milk intolerance, pain with bowel movement, hemorrhoids.  General: Denies fatigue, chills/fever, night sweats, weight loss, loss of appetite, weight gain, sleep disturbance.  Neurological: Denies frequent headaches, history of stroke, recent passing out, recent dizziness, convulsions/seizures, dementia.  Cardiovascular: Denies history of heart murmur, leg swelling, history of rheumatic fever, pacemaker, chest pain or pressure after eating or when upset, automatic defibrillator, angina, other implanted devices, irregular heart rate/palpitations, high cholesterol or triglycerides, coronary stents.  Respiratory: Denies shortness of breath, chronic/frequent hoarseness, wheezing, exposure to tuberculosis, chronic cough, spitting up blood, cough up sputum, sleep apnea.  Genitourinary: Denies kidney stones,  painful/difficult urination, frequent urinary infections, frequent urination, blood in urine, incontinence, kidney failure, prostate problems.  Endocrine: Denies thyroid disease, denies diabetes.  Female complaints: Denies endometriosis, painful menstrual periods, heavy menstrual periods.  Patient is not pregnant.  Psychosocial: Denies history of mental illness, denies usually feeling lonely or depressed, denies history of depression, anxiety, history of physical or sexual abuse, stress, history of eating disorder.  Skin: Denies severe itching, unusual moles, rash, flushing, change in hair or nails.  Bone/joint: Denies arthritis, back pain, joint pain, osteoporosis.  Heme/Lymphatic: Denies easy bruising, anemia, excessive bleeding, enlarging or painful lymph nodes.  Allergy: Denies medication allergy, latex/rubber allergy, anaphylactic or other reaction to anesthesia, food allergy.   Eyes: Denies blurred/double vision, eye disease, glasses or contacts, glaucoma.  ENT: Denies nose or gums bleeding, mouth sores, bad breath or bad taste in mouth, hearing loss.    Physical Exam:   General: alert, cooperative, oriented.  No respiratory distress.   Head: Normocephalic, without obvious abnormality, atraumatic.   Eyes: Conjunctivae/corneas clear.  No scleral icterus.  No conjunctival     hemorrhage.   Nose: Nares normal.   Throat: Lips, mucosa, and tongue normal.  No thrush noted.   Neck: Soft, supple neck; trachea midline, no adenopathy, no thyromegaly.   Lungs: CTA B   Chest wall: No tenderness or deformity.   Heart: Regular rate and rhythm, normal S1S2, no murmur.   Abdomen: soft, non-tender, non-distended, no masses, no guarding, no     rebound, positive BS.   Extremity: no edema, no cyanosis   Skin: No rashes or lesions.    Neurological: Alert, interactive, no focal deficits    ASA Score: 2-Patient with mild systemic disease    Plan: Colonoscopy  Risk/Benefits:  The risks and benefits of the procedure were discussed in  detail with the patient, including the risk of bleeding, infection, pain, sedation, and perforation.  The patient was also informed that polyps and tumors can be missed on rare occasions, which may require future procedures. The patient indicates understanding of these issues and agrees to proceed with the scheduled procedure.   Chirag Thurston MD  2025  9:00 AM               [1]   Past Medical History:   Arrhythmia    bradycardia, palpitations.    Arthritis    Osteoarthritis Back & Hip    Back pain    Basal cell carcinoma    Blood in the stool    2024 (Visited ER)    Disorder of thyroid    Diverticulitis    Dizziness    Heart palpitations    Bradycardia    Hemorrhoid    internal and external    Hemorrhoids    2024 (Visited ER)    History of cardiac murmur    Hx of motion sickness    Hypothyroidism    Osteoarthritis    HIp,  Lumbar    Visual impairment    Wears glasses   [2]   Past Surgical History:  Procedure Laterality Date    Colonoscopy  2006;2010    Diverticulosis    Jennifer biopsy stereo nodule 1 site left (cpt=19081) Left 2024    fibroadenomatous changes      Not applicable    Not applicable    Other surgical history  Dec/1991    thyroidectomy    Skin surgery  12/23/15    Basil cell carcinoma-face    Thyroidectomy      Thyroidectomy     [3]   Family History  Problem Relation Age of Onset    Other (Other) Father         cirrohosis    Stroke Mother     Other (Other) Mother         pneumonia    Colon Polyps Paternal Grandfather     Colon Polyps Brother     Hypertension Brother         high blood pressure    Heart Disorder Brother         Afib; sleep apnea    Lipids Brother         Cholesterol    Musculo-skelatal Disorder Brother         Lower Back/Disc    Colon Polyps Brother    [4]   Allergies  Allergen Reactions    Adhesive Tape ITCHING     Long term use is bothersome.     Penicillins ITCHING     Childhood. No hospitalized. No cardiac/respiratory distress. No organ damage.      [5]     lactated ringers infusion   Intravenous Continuous   [6] No current outpatient medications on file.

## 2025-07-14 NOTE — DISCHARGE INSTRUCTIONS
Home Care Instructions for Colonoscopy with Sedation    Diet:  - Resume your regular diet as tolerated unless otherwise instructed.  - Start with light meals to minimize bloating.  - Do not drink alcohol today.    Medication:  - If you have questions about resuming your normal medications, please contact your Primary Care Physician.    Activities:  - Take it easy today. Do not return to work today.  - Do not drive today.  - Do not operate any machinery today (including kitchen equipment).    Colonoscopy:  - You may notice some rectal \"spotting\" (a little blood on the toilet tissue) for a day or two after the exam. This is normal.  - If you experience any rectal bleeding (not spotting), persistent tenderness or sharp severe abdominal pains, oral temperature over 100 degrees Fahrenheit, light-headedness or dizziness, or any other problems, contact your doctor.    **If unable to reach your doctor, please go to the [facility] Emergency Room**    - Your referring physician will receive a full report of your examination.  - If you do not hear from your doctor's office within two weeks of your biopsy, please call them for your results.    You may be able to see your laboratory results in [facility] between 4 and 7 business days.  In some cases, your physician may not have viewed the results before they are released to [facility].  If you have questions regarding your results contact the physician who ordered the test/exam by phone or via [facility] by choosing \"Ask a Medical Question.\"

## 2025-07-14 NOTE — ANESTHESIA POSTPROCEDURE EVALUATION
[facility]    Magda Isabel Patient Status:  Hospital Outpatient Surgery   Age/Gender 74 year old female MRN [id number]   Location [facility] ENDOSCOPY [facility] Attending Chirag Thurston MD   Hosp Day # 0 PCP Suzanne Mancia MD       Anesthesia Post-op Note    COLONOSCOPY with cold snare polypectomy    Procedure Summary       Date: 07/14/25 Room / Location:  ENDOSCOPY 02 /  ENDOSCOPY    Anesthesia Start: 0912 Anesthesia Stop: 0940    Procedure: COLONOSCOPY with cold snare polypectomy Diagnosis:       Hematochezia      (polyps, divericulosis, hemorrhoids)    Surgeons: Chirag Thurston MD Anesthesiologist: Travis Stewart DO    Anesthesia Type: MAC ASA Status: 2            Anesthesia Type: MAC    Vitals Value Taken Time   /54 07/14/25 09:41   Temp  07/14/25 09:41   Pulse 61 07/14/25 09:41   Resp 18 07/14/25 09:41   SpO2 98 07/14/25 09:41           Patient Location: Endoscopy    Anesthesia Type: MAC    Airway Patency: patent    Postop Pain Control: adequate    Mental Status: mildly sedated but able to meaningfully participate in the post-anesthesia evaluation    Nausea/Vomiting: none    Cardiopulmonary/Hydration status: stable euvolemic    Complications: no apparent anesthesia related complications    Postop vital signs: stable    Dental Exam: Unchanged from Preop    Patient to be discharged from PACU when criteria met.

## (undated) DIAGNOSIS — R79.89 ABNORMAL TSH: Primary | ICD-10-CM

## (undated) DIAGNOSIS — R94.6 BORDERLINE ABNORMAL TFTS: Primary | ICD-10-CM

## (undated) DEVICE — 3M™ RED DOT™ MONITORING ELECTRODE WITH FOAM TAPE AND STICKY GEL, 50/BAG, 20/CASE, 72/PLT 2570: Brand: RED DOT™

## (undated) DEVICE — 1200CC GUARDIAN II: Brand: GUARDIAN

## (undated) DEVICE — KIT VLV 5 PC AIR H2O SUCT BX ENDOGATOR CONN

## (undated) DEVICE — V2 SPECIMEN COLLECTION MANIFOLD KIT: Brand: NEPTUNE

## (undated) DEVICE — LASSO POLYPECTOMY SNARE: Brand: LASSO

## (undated) DEVICE — KIT CUSTOM ENDOPROCEDURE STERIS

## (undated) DEVICE — 10FT COMBINED O2 DELIVERY/CO2 MONITORING. FILTER WITH MICROSTREAM TYPE LUER: Brand: DUAL ADULT NASAL CANNULA

## (undated) NOTE — LETTER
03/12/21        [address] [place] [address]      Dear Jeanne Brenner,    [address] [facility] records indicate that you have outstanding lab work and or testing that was ordered for you and has not yet been completed:  Orders Placed This Encounter

## (undated) NOTE — LETTER
Patient Name: Amy Mckeon  YOB: 1950          MRN :  [id number]  Date:  5/12/2023  Referring Physician:  Svetlana Little    Discharge Summary  Pt has attended 6 visits in Physical Therapy. Subjective: Patient reports that she is doing better. Pain: 0/10      Objective:   Lumbar AROM: (* denotes performed with pain)  Flexion: 80  Extension: 20  Sidebending: R WFL; L WFL  Rotation: R WFL; L WFL    BL hip flexion 4+/5      Assessment: Patient is doing much better with almost a full resolution of symptoms. Patient has met all her goals and is appropriate for d/c from PT at this time. Goals:   Goals: (to be met in 10 visits)   Long Term Goals     Patient will be independent in HEP and demonstrate correct posture/body mechanics to prevent re-injury. MET 5/12/2023  Patient will demonstrate no more than 1/10 pain with lifting a 20# box from the floor. MET 5/12/2023  Patient will demonstrate increased BL hip flexion strength by at least 1/2 a grade in order to improve her ability to ambulate without pain or difficulty. MET 5/12/2023  Patient will increase lumbar extension ROM to at least 15 degrees without pain. MET 5/12/2023    Plan: d/c    Patient/Family/Caregiver was advised of these findings, precautions, and treatment options and has agreed to actively participate in planning and for this course of care. Thank you for your referral. If you have any questions, please contact me at Dept: [phone]. Sincerely,  Electronically signed by therapist: Mirella Hinojosa PT     Physician's certification required:  No    Certification From: 3/20/5113  To:8/10/2023            [employer] Act Notice to Patient: Medical documents like this are made available to patients in the interest of transparency. However, be advised this is a medical document and it is intended as [id number] communication between your medical providers.  This medical document may contain abbreviations, assessments, medical data, and results or other terms that are unfamiliar. Medical documents are intended to carry relevant information, facts as evident, and the clinical opinion of the practitioner. As such, this medical document may be written in language that appears blunt or direct. You are encouraged to contact your medical provider and/or [facility] Experience if you have any questions about this medical document.